# Patient Record
Sex: FEMALE | Race: WHITE | NOT HISPANIC OR LATINO | Employment: FULL TIME | URBAN - NONMETROPOLITAN AREA
[De-identification: names, ages, dates, MRNs, and addresses within clinical notes are randomized per-mention and may not be internally consistent; named-entity substitution may affect disease eponyms.]

---

## 2017-03-29 ENCOUNTER — APPOINTMENT (OUTPATIENT)
Dept: ULTRASOUND IMAGING | Facility: HOSPITAL | Age: 22
End: 2017-03-29

## 2017-03-29 ENCOUNTER — HOSPITAL ENCOUNTER (OUTPATIENT)
Facility: HOSPITAL | Age: 22
Setting detail: OBSERVATION
Discharge: HOME | End: 2017-03-30
Attending: EMERGENCY MEDICINE | Admitting: OBSTETRICS & GYNECOLOGY

## 2017-03-29 DIAGNOSIS — O03.9 SPONTANEOUS ABORTION: Primary | ICD-10-CM

## 2017-03-29 DIAGNOSIS — D62 ACUTE BLOOD LOSS ANEMIA: ICD-10-CM

## 2017-03-29 PROCEDURE — 99284 EMERGENCY DEPT VISIT MOD MDM: CPT

## 2017-03-30 ENCOUNTER — APPOINTMENT (OUTPATIENT)
Dept: ULTRASOUND IMAGING | Facility: HOSPITAL | Age: 22
End: 2017-03-30

## 2017-03-30 VITALS
SYSTOLIC BLOOD PRESSURE: 104 MMHG | RESPIRATION RATE: 18 BRPM | DIASTOLIC BLOOD PRESSURE: 54 MMHG | OXYGEN SATURATION: 98 % | WEIGHT: 116 LBS | BODY MASS INDEX: 20.55 KG/M2 | TEMPERATURE: 98.1 F | HEIGHT: 63 IN | HEART RATE: 101 BPM

## 2017-03-30 PROBLEM — O03.9 SPONTANEOUS ABORTION: Status: ACTIVE | Noted: 2017-03-30

## 2017-03-30 PROBLEM — O03.9 SPONTANEOUS ABORTION: Status: RESOLVED | Noted: 2017-03-30 | Resolved: 2017-03-30

## 2017-03-30 LAB
ABO GROUP BLD: NORMAL
BASOPHILS # BLD AUTO: 0.01 10*3/MM3 (ref 0–0.3)
BASOPHILS NFR BLD AUTO: 0.1 % (ref 0–2)
BLD GP AB SCN SERPL QL: NEGATIVE
DEPRECATED RDW RBC AUTO: 54.3 FL (ref 37–54)
EOSINOPHIL # BLD AUTO: 0.18 10*3/MM3 (ref 0–0.7)
EOSINOPHIL NFR BLD AUTO: 1.9 % (ref 0–5)
ERYTHROCYTE [DISTWIDTH] IN BLOOD BY AUTOMATED COUNT: 20.6 % (ref 11.5–14.5)
HCT VFR BLD AUTO: 25.4 % (ref 37–47)
HCT VFR BLD AUTO: 27.9 % (ref 37–47)
HGB BLD-MCNC: 7.6 G/DL (ref 12–16)
HGB BLD-MCNC: 8.8 G/DL (ref 12–16)
IMM GRANULOCYTES # BLD: 0.01 10*3/MM3 (ref 0–0.03)
IMM GRANULOCYTES NFR BLD: 0.1 % (ref 0–0.5)
LYMPHOCYTES # BLD AUTO: 2.54 10*3/MM3 (ref 1–3)
LYMPHOCYTES NFR BLD AUTO: 26.9 % (ref 21–51)
MCH RBC QN AUTO: 24 PG (ref 27–33)
MCHC RBC AUTO-ENTMCNC: 31.5 G/DL (ref 33–37)
MCV RBC AUTO: 76 FL (ref 80–94)
MONOCYTES # BLD AUTO: 0.5 10*3/MM3 (ref 0.1–0.9)
MONOCYTES NFR BLD AUTO: 5.3 % (ref 0–10)
NEUTROPHILS # BLD AUTO: 6.19 10*3/MM3 (ref 1.4–6.5)
NEUTROPHILS NFR BLD AUTO: 65.7 % (ref 30–70)
PLATELET # BLD AUTO: 228 10*3/MM3 (ref 130–400)
PMV BLD AUTO: 11.7 FL (ref 6–10)
RBC # BLD AUTO: 3.67 10*6/MM3 (ref 4.2–5.4)
RH BLD: POSITIVE
WBC NRBC COR # BLD: 9.43 10*3/MM3 (ref 4.5–12.5)

## 2017-03-30 PROCEDURE — 88300 SURGICAL PATH GROSS: CPT | Performed by: EMERGENCY MEDICINE

## 2017-03-30 PROCEDURE — 76817 TRANSVAGINAL US OBSTETRIC: CPT | Performed by: RADIOLOGY

## 2017-03-30 PROCEDURE — G0378 HOSPITAL OBSERVATION PER HR: HCPCS

## 2017-03-30 PROCEDURE — 85014 HEMATOCRIT: CPT | Performed by: PHYSICIAN ASSISTANT

## 2017-03-30 PROCEDURE — 86901 BLOOD TYPING SEROLOGIC RH(D): CPT | Performed by: PHYSICIAN ASSISTANT

## 2017-03-30 PROCEDURE — 85025 COMPLETE CBC W/AUTO DIFF WBC: CPT | Performed by: PHYSICIAN ASSISTANT

## 2017-03-30 PROCEDURE — 96374 THER/PROPH/DIAG INJ IV PUSH: CPT

## 2017-03-30 PROCEDURE — 25010000002 MORPHINE SULFATE (PF) 2 MG/ML SOLUTION: Performed by: EMERGENCY MEDICINE

## 2017-03-30 PROCEDURE — 86900 BLOOD TYPING SEROLOGIC ABO: CPT | Performed by: PHYSICIAN ASSISTANT

## 2017-03-30 PROCEDURE — 76817 TRANSVAGINAL US OBSTETRIC: CPT

## 2017-03-30 PROCEDURE — 96375 TX/PRO/DX INJ NEW DRUG ADDON: CPT

## 2017-03-30 PROCEDURE — 96361 HYDRATE IV INFUSION ADD-ON: CPT

## 2017-03-30 PROCEDURE — 85018 HEMOGLOBIN: CPT | Performed by: PHYSICIAN ASSISTANT

## 2017-03-30 PROCEDURE — 86920 COMPATIBILITY TEST SPIN: CPT

## 2017-03-30 PROCEDURE — 86850 RBC ANTIBODY SCREEN: CPT | Performed by: EMERGENCY MEDICINE

## 2017-03-30 RX ORDER — FUROSEMIDE 10 MG/ML
20 INJECTION INTRAMUSCULAR; INTRAVENOUS ONCE
Status: DISCONTINUED | OUTPATIENT
Start: 2017-03-30 | End: 2017-03-30 | Stop reason: HOSPADM

## 2017-03-30 RX ORDER — ACETAMINOPHEN 325 MG/1
650 TABLET ORAL ONCE
Status: DISCONTINUED | OUTPATIENT
Start: 2017-03-30 | End: 2017-03-30 | Stop reason: HOSPADM

## 2017-03-30 RX ORDER — SODIUM CHLORIDE, SODIUM LACTATE, POTASSIUM CHLORIDE, CALCIUM CHLORIDE 600; 310; 30; 20 MG/100ML; MG/100ML; MG/100ML; MG/100ML
125 INJECTION, SOLUTION INTRAVENOUS CONTINUOUS
Status: DISCONTINUED | OUTPATIENT
Start: 2017-03-30 | End: 2017-03-30 | Stop reason: HOSPADM

## 2017-03-30 RX ORDER — OXYTOCIN 10 [USP'U]/ML
INJECTION, SOLUTION INTRAMUSCULAR; INTRAVENOUS
Status: COMPLETED
Start: 2017-03-30 | End: 2017-03-30

## 2017-03-30 RX ORDER — SODIUM CHLORIDE 0.9 % (FLUSH) 0.9 %
10 SYRINGE (ML) INJECTION AS NEEDED
Status: DISCONTINUED | OUTPATIENT
Start: 2017-03-30 | End: 2017-03-30 | Stop reason: HOSPADM

## 2017-03-30 RX ORDER — PRENATAL VIT/IRON FUM/FOLIC AC 27MG-0.8MG
1 TABLET ORAL DAILY
COMMUNITY
End: 2017-03-30 | Stop reason: HOSPADM

## 2017-03-30 RX ORDER — DIPHENHYDRAMINE HCL 25 MG
25 CAPSULE ORAL ONCE
Status: DISCONTINUED | OUTPATIENT
Start: 2017-03-30 | End: 2017-03-30 | Stop reason: HOSPADM

## 2017-03-30 RX ORDER — OXYTOCIN 10 [USP'U]/ML
10 INJECTION, SOLUTION INTRAMUSCULAR; INTRAVENOUS ONCE
Status: COMPLETED | OUTPATIENT
Start: 2017-03-30 | End: 2017-03-30

## 2017-03-30 RX ORDER — PRENATAL VIT/IRON FUM/FOLIC AC 27MG-0.8MG
1 TABLET ORAL DAILY
Status: DISCONTINUED | OUTPATIENT
Start: 2017-03-31 | End: 2017-03-30 | Stop reason: HOSPADM

## 2017-03-30 RX ORDER — MORPHINE SULFATE 2 MG/ML
2 INJECTION, SOLUTION INTRAMUSCULAR; INTRAVENOUS ONCE
Status: COMPLETED | OUTPATIENT
Start: 2017-03-30 | End: 2017-03-30

## 2017-03-30 RX ADMIN — OXYTOCIN 10 UNITS: 10 INJECTION, SOLUTION INTRAMUSCULAR; INTRAVENOUS at 00:53

## 2017-03-30 RX ADMIN — SODIUM CHLORIDE 1000 ML: 9 INJECTION, SOLUTION INTRAVENOUS at 02:29

## 2017-03-30 RX ADMIN — SODIUM CHLORIDE 1000 ML: 9 INJECTION, SOLUTION INTRAVENOUS at 00:10

## 2017-03-30 RX ADMIN — MORPHINE SULFATE 2 MG: 2 INJECTION, SOLUTION INTRAMUSCULAR; INTRAVENOUS at 01:13

## 2017-04-02 LAB
ABO + RH BLD: NORMAL
ABO + RH BLD: NORMAL
BH BB BLOOD EXPIRATION DATE: NORMAL
BH BB BLOOD EXPIRATION DATE: NORMAL
BH BB BLOOD TYPE BARCODE: 6200
BH BB BLOOD TYPE BARCODE: 6200
BH BB DISPENSE STATUS: NORMAL
BH BB DISPENSE STATUS: NORMAL
BH BB PRODUCT CODE: NORMAL
BH BB PRODUCT CODE: NORMAL
BH BB UNIT NUMBER: NORMAL
BH BB UNIT NUMBER: NORMAL
CROSSMATCH INTERPRETATION: NORMAL
CROSSMATCH INTERPRETATION: NORMAL
UNIT  ABO: NORMAL
UNIT  ABO: NORMAL
UNIT  RH: NORMAL
UNIT  RH: NORMAL

## 2017-04-03 ENCOUNTER — HOSPITAL ENCOUNTER (OUTPATIENT)
Facility: HOSPITAL | Age: 22
Setting detail: OBSERVATION
Discharge: HOME | End: 2017-04-04
Attending: EMERGENCY MEDICINE | Admitting: OBSTETRICS & GYNECOLOGY

## 2017-04-03 ENCOUNTER — LAB (OUTPATIENT)
Dept: LAB | Facility: HOSPITAL | Age: 22
End: 2017-04-03

## 2017-04-03 ENCOUNTER — TRANSCRIBE ORDERS (OUTPATIENT)
Dept: ADMINISTRATIVE | Facility: HOSPITAL | Age: 22
End: 2017-04-03

## 2017-04-03 DIAGNOSIS — O02.1 ABORTION, MISSED: Primary | ICD-10-CM

## 2017-04-03 DIAGNOSIS — O02.1 ABORTION, MISSED: ICD-10-CM

## 2017-04-03 DIAGNOSIS — D62 ACUTE BLOOD LOSS ANEMIA: Primary | ICD-10-CM

## 2017-04-03 DIAGNOSIS — O03.6: ICD-10-CM

## 2017-04-03 LAB
ABO GROUP BLD: NORMAL
BASOPHILS # BLD AUTO: 0.01 10*3/MM3 (ref 0–0.3)
BASOPHILS NFR BLD AUTO: 0.2 % (ref 0–2)
BLD GP AB SCN SERPL QL: NEGATIVE
DEPRECATED RDW RBC AUTO: 59.5 FL (ref 37–54)
EOSINOPHIL # BLD AUTO: 0.14 10*3/MM3 (ref 0–0.7)
EOSINOPHIL NFR BLD AUTO: 2.6 % (ref 0–5)
ERYTHROCYTE [DISTWIDTH] IN BLOOD BY AUTOMATED COUNT: 20.7 % (ref 11.5–14.5)
HCG INTACT+B SERPL-ACNC: 591 MIU/ML (ref 0–5)
HCT VFR BLD AUTO: 19 % (ref 37–47)
HGB BLD-MCNC: 5.8 G/DL (ref 12–16)
IMM GRANULOCYTES # BLD: 0.01 10*3/MM3 (ref 0–0.03)
IMM GRANULOCYTES NFR BLD: 0.2 % (ref 0–0.5)
LAB AP CASE REPORT: NORMAL
LYMPHOCYTES # BLD AUTO: 1.5 10*3/MM3 (ref 1–3)
LYMPHOCYTES NFR BLD AUTO: 27.7 % (ref 21–51)
Lab: NORMAL
MCH RBC QN AUTO: 24 PG (ref 27–33)
MCHC RBC AUTO-ENTMCNC: 30.5 G/DL (ref 33–37)
MCV RBC AUTO: 78.5 FL (ref 80–94)
MONOCYTES # BLD AUTO: 0.35 10*3/MM3 (ref 0.1–0.9)
MONOCYTES NFR BLD AUTO: 6.5 % (ref 0–10)
NEUTROPHILS # BLD AUTO: 3.4 10*3/MM3 (ref 1.4–6.5)
NEUTROPHILS NFR BLD AUTO: 62.8 % (ref 30–70)
PATH REPORT.FINAL DX SPEC: NORMAL
PLATELET # BLD AUTO: 234 10*3/MM3 (ref 130–400)
PMV BLD AUTO: 10.5 FL (ref 6–10)
RBC # BLD AUTO: 2.42 10*6/MM3 (ref 4.2–5.4)
RH BLD: POSITIVE
WBC NRBC COR # BLD: 5.41 10*3/MM3 (ref 4.5–12.5)

## 2017-04-03 PROCEDURE — 84702 CHORIONIC GONADOTROPIN TEST: CPT | Performed by: NURSE PRACTITIONER

## 2017-04-03 PROCEDURE — 36430 TRANSFUSION BLD/BLD COMPNT: CPT

## 2017-04-03 PROCEDURE — 99284 EMERGENCY DEPT VISIT MOD MDM: CPT

## 2017-04-03 PROCEDURE — P9016 RBC LEUKOCYTES REDUCED: HCPCS

## 2017-04-03 PROCEDURE — 36415 COLL VENOUS BLD VENIPUNCTURE: CPT

## 2017-04-03 PROCEDURE — 85025 COMPLETE CBC W/AUTO DIFF WBC: CPT | Performed by: NURSE PRACTITIONER

## 2017-04-03 PROCEDURE — G0378 HOSPITAL OBSERVATION PER HR: HCPCS

## 2017-04-03 PROCEDURE — 86920 COMPATIBILITY TEST SPIN: CPT

## 2017-04-03 PROCEDURE — 86901 BLOOD TYPING SEROLOGIC RH(D): CPT | Performed by: EMERGENCY MEDICINE

## 2017-04-03 PROCEDURE — 86900 BLOOD TYPING SEROLOGIC ABO: CPT | Performed by: EMERGENCY MEDICINE

## 2017-04-03 PROCEDURE — 63710000001 DIPHENHYDRAMINE PER 50 MG: Performed by: OBSTETRICS & GYNECOLOGY

## 2017-04-03 PROCEDURE — 86900 BLOOD TYPING SEROLOGIC ABO: CPT

## 2017-04-03 PROCEDURE — 86850 RBC ANTIBODY SCREEN: CPT | Performed by: EMERGENCY MEDICINE

## 2017-04-03 RX ORDER — IBUPROFEN 200 MG
200 TABLET ORAL EVERY 6 HOURS PRN
Status: CANCELLED | OUTPATIENT
Start: 2017-04-03

## 2017-04-03 RX ORDER — ACETAMINOPHEN 325 MG/1
650 TABLET ORAL EVERY 6 HOURS PRN
Status: ON HOLD | COMMUNITY
End: 2018-04-24

## 2017-04-03 RX ORDER — IBUPROFEN 600 MG/1
600 TABLET ORAL EVERY 6 HOURS PRN
Status: DISCONTINUED | OUTPATIENT
Start: 2017-04-03 | End: 2017-04-04 | Stop reason: HOSPADM

## 2017-04-03 RX ORDER — ACETAMINOPHEN 325 MG/1
650 TABLET ORAL EVERY 6 HOURS PRN
Status: CANCELLED | OUTPATIENT
Start: 2017-04-03

## 2017-04-03 RX ORDER — SODIUM CHLORIDE 9 MG/ML
125 INJECTION, SOLUTION INTRAVENOUS ONCE
Status: COMPLETED | OUTPATIENT
Start: 2017-04-03 | End: 2017-04-03

## 2017-04-03 RX ORDER — SODIUM CHLORIDE 9 MG/ML
125 INJECTION, SOLUTION INTRAVENOUS CONTINUOUS
Status: DISCONTINUED | OUTPATIENT
Start: 2017-04-03 | End: 2017-04-04 | Stop reason: HOSPADM

## 2017-04-03 RX ORDER — SODIUM CHLORIDE, SODIUM LACTATE, POTASSIUM CHLORIDE, CALCIUM CHLORIDE 600; 310; 30; 20 MG/100ML; MG/100ML; MG/100ML; MG/100ML
125 INJECTION, SOLUTION INTRAVENOUS CONTINUOUS
Status: DISCONTINUED | OUTPATIENT
Start: 2017-04-03 | End: 2017-04-04 | Stop reason: HOSPADM

## 2017-04-03 RX ORDER — DIPHENHYDRAMINE HCL 25 MG
25 CAPSULE ORAL ONCE
Status: COMPLETED | OUTPATIENT
Start: 2017-04-03 | End: 2017-04-03

## 2017-04-03 RX ORDER — SODIUM CHLORIDE 9 MG/ML
INJECTION, SOLUTION INTRAVENOUS
Status: COMPLETED
Start: 2017-04-03 | End: 2017-04-03

## 2017-04-03 RX ORDER — ACETAMINOPHEN 325 MG/1
975 TABLET ORAL ONCE
Status: COMPLETED | OUTPATIENT
Start: 2017-04-03 | End: 2017-04-03

## 2017-04-03 RX ORDER — ACETAMINOPHEN 325 MG/1
TABLET ORAL
Status: COMPLETED
Start: 2017-04-03 | End: 2017-04-03

## 2017-04-03 RX ORDER — IBUPROFEN 200 MG
200 TABLET ORAL EVERY 6 HOURS PRN
Status: ON HOLD | COMMUNITY
End: 2018-04-07

## 2017-04-03 RX ADMIN — DIPHENHYDRAMINE HYDROCHLORIDE 25 MG: 25 CAPSULE ORAL at 15:06

## 2017-04-03 RX ADMIN — SODIUM CHLORIDE 125 ML/HR: 9 INJECTION, SOLUTION INTRAVENOUS at 17:50

## 2017-04-03 RX ADMIN — ACETAMINOPHEN 975 MG: 325 TABLET, FILM COATED ORAL at 13:45

## 2017-04-03 RX ADMIN — ACETAMINOPHEN 975 MG: 325 TABLET ORAL at 13:45

## 2017-04-03 RX ADMIN — IBUPROFEN 600 MG: 600 TABLET ORAL at 15:05

## 2017-04-03 RX ADMIN — SODIUM CHLORIDE, POTASSIUM CHLORIDE, SODIUM LACTATE AND CALCIUM CHLORIDE 125 ML/HR: 600; 310; 30; 20 INJECTION, SOLUTION INTRAVENOUS at 19:27

## 2017-04-03 RX ADMIN — SODIUM CHLORIDE 125 ML/HR: 9 INJECTION, SOLUTION INTRAVENOUS at 20:58

## 2017-04-03 NOTE — ED PROVIDER NOTES
Subjective   History of Present Illness  21-year-old white female here today for anemia.  Patient was admitted for spontaneous  4 days ago by her OB.  At the time of her discharge on 3/30 her hemoglobin was 7.6.  She went to their office today and saw the nurse practitioner for follow-up.  She had labs drawn and went home.  Later, they called her and told her to come to the emergency room because she was anemic.  She states that she is still having some bleeding, approximately 1 pad every 1/2-2 hours.  Review of Systems   All other systems reviewed and are negative.      No past medical history on file.    No Known Allergies    No past surgical history on file.    No family history on file.    Social History     Social History   • Marital status: Single     Spouse name: N/A   • Number of children: N/A   • Years of education: N/A     Social History Main Topics   • Smoking status: Never Smoker   • Smokeless tobacco: Not on file   • Alcohol use No   • Drug use: No   • Sexual activity: Defer     Other Topics Concern   • Not on file     Social History Narrative   • No narrative on file           Objective   Physical Exam   Constitutional: She is oriented to person, place, and time. She appears well-developed.   HENT:   Head: Normocephalic and atraumatic.   Cardiovascular: Normal rate, regular rhythm and normal heart sounds.  Exam reveals no gallop and no friction rub.    No murmur heard.  Pulmonary/Chest: Effort normal and breath sounds normal. No respiratory distress. She has no wheezes. She has no rales.   Abdominal: Soft. Bowel sounds are normal. She exhibits no distension. There is no tenderness.   Musculoskeletal: Normal range of motion. She exhibits no edema.   Neurological: She is alert and oriented to person, place, and time.   Skin: Skin is warm and dry. There is pallor.   Psychiatric: She has a normal mood and affect.   Nursing note and vitals reviewed.      Procedures  Results for orders placed or  performed in visit on 17   hCG, Quantitative, Pregnancy   Result Value Ref Range    HCG Quantitative 591.00 (H) 0.00 - 5.00 mIU/mL   CBC Auto Differential   Result Value Ref Range    WBC 5.41 4.50 - 12.50 10*3/mm3    RBC 2.42 (L) 4.20 - 5.40 10*6/mm3    Hemoglobin 5.8 (C) 12.0 - 16.0 g/dL    Hematocrit 19.0 (C) 37.0 - 47.0 %    MCV 78.5 (L) 80.0 - 94.0 fL    MCH 24.0 (L) 27.0 - 33.0 pg    MCHC 30.5 (L) 33.0 - 37.0 g/dL    RDW 20.7 (H) 11.5 - 14.5 %    RDW-SD 59.5 (H) 37.0 - 54.0 fl    MPV 10.5 (H) 6.0 - 10.0 fL    Platelets 234 130 - 400 10*3/mm3    Neutrophil % 62.8 30.0 - 70.0 %    Lymphocyte % 27.7 21.0 - 51.0 %    Monocyte % 6.5 0.0 - 10.0 %    Eosinophil % 2.6 0.0 - 5.0 %    Basophil % 0.2 0.0 - 2.0 %    Immature Grans % 0.2 0.0 - 0.5 %    Neutrophils, Absolute 3.40 1.40 - 6.50 10*3/mm3    Lymphocytes, Absolute 1.50 1.00 - 3.00 10*3/mm3    Monocytes, Absolute 0.35 0.10 - 0.90 10*3/mm3    Eosinophils, Absolute 0.14 0.00 - 0.70 10*3/mm3    Basophils, Absolute 0.01 0.00 - 0.30 10*3/mm3    Immature Grans, Absolute 0.01 0.00 - 0.03 10*3/mm3            ED Course  ED Course   Comment By Time   Discussed with Dr. Jin.  Patient admitted, see orders. Darrick Riggs MD  [id number]                  MDM  Number of Diagnoses or Management Options  Acute blood loss anemia:   Spontaneous  complicated by delayed or excessive hemorrhage:      Amount and/or Complexity of Data Reviewed  Clinical lab tests: reviewed and ordered  Decide to obtain previous medical records or to obtain history from someone other than the patient: yes    Risk of Complications, Morbidity, and/or Mortality  Presenting problems: moderate  Diagnostic procedures: moderate  Management options: moderate        Final diagnoses:   Acute blood loss anemia   Spontaneous  complicated by delayed or excessive hemorrhage            Darrick Riggs MD  [phone]

## 2017-04-03 NOTE — PLAN OF CARE
Problem: Patient Care Overview (Adult)  Goal: Plan of Care Review  Outcome: Ongoing (interventions implemented as appropriate)    04/03/17 8963   Coping/Psychosocial Response Interventions   Plan Of Care Reviewed With patient;significant other   Patient Care Overview   Progress progress toward functional goals as expected   Outcome Evaluation   Outcome Summary/Follow up Plan patient is doing well., getting 2 units of blood, scant vag bleeding.        Goal: Adult Individualization and Mutuality  Outcome: Ongoing (interventions implemented as appropriate)  Goal: Discharge Needs Assessment  Outcome: Ongoing (interventions implemented as appropriate)    Problem: Fluid Volume Deficit (Adult)  Goal: Identify Related Risk Factors and Signs and Symptoms  Outcome: Ongoing (interventions implemented as appropriate)  Goal: Fluid/Electrolyte Balance  Outcome: Ongoing (interventions implemented as appropriate)  Goal: Comfort/Well Being  Outcome: Ongoing (interventions implemented as appropriate)

## 2017-04-04 VITALS
OXYGEN SATURATION: 97 % | DIASTOLIC BLOOD PRESSURE: 66 MMHG | TEMPERATURE: 98.6 F | WEIGHT: 115 LBS | HEART RATE: 68 BPM | BODY MASS INDEX: 21.16 KG/M2 | SYSTOLIC BLOOD PRESSURE: 100 MMHG | RESPIRATION RATE: 16 BRPM | HEIGHT: 62 IN

## 2017-04-04 PROBLEM — D62 ACUTE BLOOD LOSS ANEMIA: Status: RESOLVED | Noted: 2017-04-03 | Resolved: 2017-04-04

## 2017-04-04 LAB
ABO + RH BLD: NORMAL
ABO + RH BLD: NORMAL
BH BB BLOOD EXPIRATION DATE: NORMAL
BH BB BLOOD EXPIRATION DATE: NORMAL
BH BB BLOOD TYPE BARCODE: 6200
BH BB BLOOD TYPE BARCODE: 6200
BH BB DISPENSE STATUS: NORMAL
BH BB DISPENSE STATUS: NORMAL
BH BB PRODUCT CODE: NORMAL
BH BB PRODUCT CODE: NORMAL
BH BB UNIT NUMBER: NORMAL
BH BB UNIT NUMBER: NORMAL
CROSSMATCH INTERPRETATION: NORMAL
CROSSMATCH INTERPRETATION: NORMAL
HCT VFR BLD AUTO: 23 % (ref 37–47)
HGB BLD-MCNC: 7.1 G/DL (ref 12–16)
UNIT  ABO: NORMAL
UNIT  ABO: NORMAL
UNIT  RH: NORMAL
UNIT  RH: NORMAL

## 2017-04-04 PROCEDURE — G0378 HOSPITAL OBSERVATION PER HR: HCPCS

## 2017-04-04 PROCEDURE — 85014 HEMATOCRIT: CPT | Performed by: OBSTETRICS & GYNECOLOGY

## 2017-04-04 PROCEDURE — 85018 HEMOGLOBIN: CPT | Performed by: OBSTETRICS & GYNECOLOGY

## 2017-04-04 RX ADMIN — SODIUM CHLORIDE, POTASSIUM CHLORIDE, SODIUM LACTATE AND CALCIUM CHLORIDE 125 ML/HR: 600; 310; 30; 20 INJECTION, SOLUTION INTRAVENOUS at 06:37

## 2017-04-04 NOTE — H&P
Chief complaint   Chief Complaint   Patient presents with   • Abnormal Lab     low hgb       History of Present Illness: Patient is a 21 y.o. female who presents for a blood transfusion. Vaginal Bleeding secondary to spontaneous AB. Hematocrit 5.       PAST MEDICAL HISTORY   Spontaneous AB  Hematocrit 5    PAST SURGICAL HISTORY   Unremarkable    History reviewed. No pertinent family history.    Social History   Substance Use Topics   • Smoking status: Never Smoker   • Smokeless tobacco: None   • Alcohol use No       Prescriptions Prior to Admission   Medication Sig Dispense Refill Last Dose   • acetaminophen (TYLENOL) 325 MG tablet Take 650 mg by mouth Every 6 (Six) Hours As Needed for Mild Pain (1-3).   4/3/2017 at Unknown time   • ibuprofen (ADVIL,MOTRIN) 200 MG tablet Take 200 mg by mouth Every 6 (Six) Hours As Needed for Mild Pain (1-3).   4/2/2017 at Unknown time       Allergies:  Review of patient's allergies indicates no known allergies.      Vital Signs  Temp:  [98 °F (36.7 °C)-99.7 °F (37.6 °C)] 98.6 °F (37 °C)  Heart Rate:  [67-91] 68  Resp:  [16-20] 16  BP: ()/(51-75) 100/66      Review of Systems    The following systems were reviewed and negative;  ENT, respiratory, cardiovascular, gastrointestinal, genitourinary, breast, endocrine and allergies / immunologic.      Physical Exam:      General Appearance:    Alert, cooperative, in no acute distress   Head:    Normocephalic, without obvious abnormality, atraumatic   Eyes:            Lids and lashes normal, conjunctivae and sclerae normal, no   icterus, no pallor, corneas clear, PERRLA   Ears:    Ears appear intact with no abnormalities noted   Throat:   No oral lesions, no thrush, oral mucosa moist   Neck:   No adenopathy, supple, trachea midline, no thyromegaly, no     carotid bruit, no JVD   Back:     No kyphosis present, no scoliosis present, no skin lesions,       erythema or scars, no tenderness to percussion or                   palpation,    range of motion normal   Lungs:     Clear to auscultation,respirations regular, even and                   unlabored    Heart:    Regular rhythm and normal rate, normal S1 and S2, no            murmur, no gallop, no rub, no click   Breast Exam:    Deferred   Abdomen:     Normal bowel sounds, no masses, no organomegaly, soft        non-tender, non-distended, no guarding, no rebound                 tenderness   Genitalia:    Deferred   Extremities:   Moves all extremities well, no edema, no cyanosis, no              redness   Pulses:   Pulses palpable and equal bilaterally   Skin:   No bleeding, bruising or rash   Lymph nodes:   No palpable adenopathy   Neurologic:   Cranial nerves 2 - 12 grossly intact, sensation intact, DTR        present and equal bilaterally         Assessment: Patient is a 21 y.o. female who presents with vaginal bleeding. Spontaneous AB. Hematocrit 5.    Plan of Care: Proceed with 2 units blood transfusion.       Miguel Jin III, MD  04/04/17  9:50 AM

## 2017-04-04 NOTE — PLAN OF CARE
Problem: Patient Care Overview (Adult)  Goal: Plan of Care Review  Outcome: Ongoing (interventions implemented as appropriate)    04/03/17 1613 04/03/17 2000   Coping/Psychosocial Response Interventions   Plan Of Care Reviewed With --  patient   Patient Care Overview   Progress progress toward functional goals as expected --    Outcome Evaluation   Outcome Summary/Follow up Plan patient is doing well., getting 2 units of blood, scant vag bleeding.  --        Goal: Adult Individualization and Mutuality  Outcome: Ongoing (interventions implemented as appropriate)    Problem: Fluid Volume Deficit (Adult)  Goal: Identify Related Risk Factors and Signs and Symptoms  Outcome: Ongoing (interventions implemented as appropriate)    04/03/17 1613   Fluid Volume Deficit   Signs and Symptoms (Fluid Volume Deficit) lab value changes       Goal: Fluid/Electrolyte Balance  Outcome: Ongoing (interventions implemented as appropriate)  Goal: Comfort/Well Being  Outcome: Ongoing (interventions implemented as appropriate)

## 2017-04-04 NOTE — PLAN OF CARE
Problem: Patient Care Overview (Adult)  Goal: Plan of Care Review  Outcome: Outcome(s) achieved Date Met:  04/04/17  Goal: Adult Individualization and Mutuality  Outcome: Outcome(s) achieved Date Met:  04/04/17  Goal: Discharge Needs Assessment  Outcome: Outcome(s) achieved Date Met:  04/04/17

## 2017-04-04 NOTE — DISCHARGE SUMMARY
Discharge Summary    Admit Date: 4/3/2017 12:29 PM    Admit Diagnosis: Acute blood loss anemia [D62]    Date of Discharge:  4/4/2017    Discharge Diagnosis: Same        Hospital Course  Patient is a 21 y.o. female, admitted on 4-3-17 secondary to vaginal bleeding following a spontaneous AB. Patient doing well after blood transfusion. Symptoms have improved. Patient tolerating a regular diet and ambulating without difficulty. Will discharge to home today.         Vital Signs  Temp:  [98 °F (36.7 °C)-99.7 °F (37.6 °C)] 98.6 °F (37 °C)  Heart Rate:  [67-91] 68  Resp:  [16-20] 16  BP: ()/(51-75) 100/66    Review of Systems    The following systems were reviewed and negative;  ENT, respiratory, cardiovascular, gastrointestinal, genitourinary, breast, endocrine and allergies / immunologic.      Physical Exam:      General Appearance:    Alert, cooperative, in no acute distress   Head:    Normocephalic, without obvious abnormality, atraumatic   Eyes:            Lids and lashes normal, conjunctivae and sclerae normal, no   icterus, no pallor, corneas clear, PERRLA   Ears:    Ears appear intact with no abnormalities noted   Throat:   No oral lesions, no thrush, oral mucosa moist   Neck:   No adenopathy, supple, trachea midline, no thyromegaly, no     carotid bruit, no JVD   Back:     No kyphosis present, no scoliosis present, no skin lesions,       erythema or scars, no tenderness to percussion or                   palpation,   range of motion normal   Lungs:     Clear to auscultation,respirations regular, even and                   unlabored    Heart:    Regular rhythm and normal rate, normal S1 and S2, no            murmur, no gallop, no rub, no click   Breast Exam:    Deferred   Abdomen:     Normal bowel sounds, no masses, no organomegaly, soft        non-tender, non-distended, no guarding, no rebound                 tenderness   Genitalia:    Deferred   Extremities:   Moves all extremities well, no edema, no  cyanosis, no              redness   Pulses:   Pulses palpable and equal bilaterally   Skin:   No bleeding, bruising or rash   Lymph nodes:   No palpable adenopathy   Neurologic:   Cranial nerves 2 - 12 grossly intact, sensation intact, DTR        present and equal bilaterally             Condition on Discharge:  Stable    Urine Output Good    Discharge Diet: Regular    Follow-up Appointments  Patient will follow up in clinic this week.        Miguel Jin MD.   04/04/17  9:53 AM

## 2017-11-06 LAB
EXTERNAL CHLAMYDIA SCREEN: NEGATIVE
EXTERNAL GONORRHEA SCREEN: NEGATIVE
EXTERNAL HEPATITIS B SURFACE ANTIGEN: NEGATIVE
EXTERNAL SYPHILIS RPR SCREEN: NORMAL
HIV1 P24 AG SERPL QL IA: NORMAL

## 2017-12-06 ENCOUNTER — APPOINTMENT (OUTPATIENT)
Dept: ULTRASOUND IMAGING | Facility: HOSPITAL | Age: 22
End: 2017-12-06

## 2017-12-06 ENCOUNTER — HOSPITAL ENCOUNTER (EMERGENCY)
Facility: HOSPITAL | Age: 22
Discharge: HOME | End: 2017-12-06
Attending: EMERGENCY MEDICINE | Admitting: EMERGENCY MEDICINE

## 2017-12-06 VITALS
TEMPERATURE: 98.7 F | HEART RATE: 84 BPM | DIASTOLIC BLOOD PRESSURE: 77 MMHG | HEIGHT: 62 IN | WEIGHT: 109 LBS | OXYGEN SATURATION: 100 % | RESPIRATION RATE: 18 BRPM | BODY MASS INDEX: 20.06 KG/M2 | SYSTOLIC BLOOD PRESSURE: 109 MMHG

## 2017-12-06 DIAGNOSIS — N39.0 URINARY TRACT INFECTION WITH HEMATURIA, SITE UNSPECIFIED: ICD-10-CM

## 2017-12-06 DIAGNOSIS — Z3A.19 19 WEEKS GESTATION OF PREGNANCY: Primary | ICD-10-CM

## 2017-12-06 DIAGNOSIS — R31.9 URINARY TRACT INFECTION WITH HEMATURIA, SITE UNSPECIFIED: ICD-10-CM

## 2017-12-06 LAB
ALBUMIN SERPL-MCNC: 4 G/DL (ref 3.5–5)
ALBUMIN/GLOB SERPL: 1.4 G/DL (ref 1.5–2.5)
ALP SERPL-CCNC: 40 U/L (ref 35–104)
ALT SERPL W P-5'-P-CCNC: 40 U/L (ref 10–36)
ANION GAP SERPL CALCULATED.3IONS-SCNC: 7.5 MMOL/L (ref 3.6–11.2)
ANISOCYTOSIS BLD QL: NORMAL
AST SERPL-CCNC: 35 U/L (ref 10–30)
BACTERIA UR QL AUTO: ABNORMAL /HPF
BASOPHILS # BLD AUTO: 0.02 10*3/MM3 (ref 0–0.3)
BASOPHILS NFR BLD AUTO: 0.2 % (ref 0–2)
BILIRUB SERPL-MCNC: 0.3 MG/DL (ref 0.2–1.8)
BILIRUB UR QL STRIP: NEGATIVE
BUN BLD-MCNC: 8 MG/DL (ref 7–21)
BUN/CREAT SERPL: 18.2 (ref 7–25)
CALCIUM SPEC-SCNC: 9.3 MG/DL (ref 7.7–10)
CHLORIDE SERPL-SCNC: 103 MMOL/L (ref 99–112)
CLARITY UR: ABNORMAL
CO2 SERPL-SCNC: 25.5 MMOL/L (ref 24.3–31.9)
COLOR UR: ABNORMAL
CREAT BLD-MCNC: 0.44 MG/DL (ref 0.43–1.29)
DEPRECATED RDW RBC AUTO: 69.5 FL (ref 37–54)
EOSINOPHIL # BLD AUTO: 0.19 10*3/MM3 (ref 0–0.7)
EOSINOPHIL NFR BLD AUTO: 2 % (ref 0–5)
ERYTHROCYTE [DISTWIDTH] IN BLOOD BY AUTOMATED COUNT: 26 % (ref 11.5–14.5)
GFR SERPL CREATININE-BSD FRML MDRD: >150 ML/MIN/1.73
GLOBULIN UR ELPH-MCNC: 2.9 GM/DL
GLUCOSE BLD-MCNC: 86 MG/DL (ref 70–110)
GLUCOSE UR STRIP-MCNC: NEGATIVE MG/DL
HCT VFR BLD AUTO: 35.1 % (ref 37–47)
HGB BLD-MCNC: 11.7 G/DL (ref 12–16)
HGB UR QL STRIP.AUTO: ABNORMAL
HYALINE CASTS UR QL AUTO: ABNORMAL /LPF
IMM GRANULOCYTES # BLD: 0.02 10*3/MM3 (ref 0–0.03)
IMM GRANULOCYTES NFR BLD: 0.2 % (ref 0–0.5)
KETONES UR QL STRIP: NEGATIVE
LARGE PLATELETS: NORMAL
LEUKOCYTE ESTERASE UR QL STRIP.AUTO: ABNORMAL
LYMPHOCYTES # BLD AUTO: 1.69 10*3/MM3 (ref 1–3)
LYMPHOCYTES NFR BLD AUTO: 17.6 % (ref 21–51)
MCH RBC QN AUTO: 25.5 PG (ref 27–33)
MCHC RBC AUTO-ENTMCNC: 33.3 G/DL (ref 33–37)
MCV RBC AUTO: 76.6 FL (ref 80–94)
MICROCYTES BLD QL: NORMAL
MONOCYTES # BLD AUTO: 0.56 10*3/MM3 (ref 0.1–0.9)
MONOCYTES NFR BLD AUTO: 5.8 % (ref 0–10)
NEUTROPHILS # BLD AUTO: 7.12 10*3/MM3 (ref 1.4–6.5)
NEUTROPHILS NFR BLD AUTO: 74.2 % (ref 30–70)
NITRITE UR QL STRIP: NEGATIVE
OSMOLALITY SERPL CALC.SUM OF ELEC: 269.6 MOSM/KG (ref 273–305)
PH UR STRIP.AUTO: 6.5 [PH] (ref 5–8)
PLATELET # BLD AUTO: 187 10*3/MM3 (ref 130–400)
PMV BLD AUTO: 11 FL (ref 6–10)
POTASSIUM BLD-SCNC: 3.9 MMOL/L (ref 3.5–5.3)
PROT SERPL-MCNC: 6.9 G/DL (ref 6–8)
PROT UR QL STRIP: ABNORMAL
RBC # BLD AUTO: 4.58 10*6/MM3 (ref 4.2–5.4)
RBC # UR: ABNORMAL /HPF
REF LAB TEST METHOD: ABNORMAL
SODIUM BLD-SCNC: 136 MMOL/L (ref 135–153)
SP GR UR STRIP: 1.02 (ref 1–1.03)
SQUAMOUS #/AREA URNS HPF: ABNORMAL /HPF
UROBILINOGEN UR QL STRIP: ABNORMAL
WBC NRBC COR # BLD: 9.6 10*3/MM3 (ref 4.5–12.5)
WBC UR QL AUTO: ABNORMAL /HPF

## 2017-12-06 PROCEDURE — 85007 BL SMEAR W/DIFF WBC COUNT: CPT | Performed by: PHYSICIAN ASSISTANT

## 2017-12-06 PROCEDURE — 87086 URINE CULTURE/COLONY COUNT: CPT | Performed by: PHYSICIAN ASSISTANT

## 2017-12-06 PROCEDURE — 96361 HYDRATE IV INFUSION ADD-ON: CPT

## 2017-12-06 PROCEDURE — 76805 OB US >/= 14 WKS SNGL FETUS: CPT | Performed by: RADIOLOGY

## 2017-12-06 PROCEDURE — 76805 OB US >/= 14 WKS SNGL FETUS: CPT

## 2017-12-06 PROCEDURE — 99283 EMERGENCY DEPT VISIT LOW MDM: CPT

## 2017-12-06 PROCEDURE — 80053 COMPREHEN METABOLIC PANEL: CPT | Performed by: PHYSICIAN ASSISTANT

## 2017-12-06 PROCEDURE — 85025 COMPLETE CBC W/AUTO DIFF WBC: CPT | Performed by: PHYSICIAN ASSISTANT

## 2017-12-06 PROCEDURE — 81001 URINALYSIS AUTO W/SCOPE: CPT | Performed by: PHYSICIAN ASSISTANT

## 2017-12-06 PROCEDURE — 96360 HYDRATION IV INFUSION INIT: CPT

## 2017-12-06 RX ORDER — SODIUM CHLORIDE, SODIUM LACTATE, POTASSIUM CHLORIDE, CALCIUM CHLORIDE 600; 310; 30; 20 MG/100ML; MG/100ML; MG/100ML; MG/100ML
1000 INJECTION, SOLUTION INTRAVENOUS CONTINUOUS
Status: DISCONTINUED | OUTPATIENT
Start: 2017-12-06 | End: 2017-12-06 | Stop reason: HOSPADM

## 2017-12-06 RX ORDER — CLINDAMYCIN HYDROCHLORIDE 150 MG/1
300 CAPSULE ORAL ONCE
Status: DISCONTINUED | OUTPATIENT
Start: 2017-12-06 | End: 2017-12-06

## 2017-12-06 RX ORDER — NITROFURANTOIN 25; 75 MG/1; MG/1
100 CAPSULE ORAL 2 TIMES DAILY
Qty: 14 CAPSULE | Refills: 0 | Status: ON HOLD | OUTPATIENT
Start: 2017-12-06 | End: 2018-03-29

## 2017-12-06 RX ORDER — NITROFURANTOIN 25; 75 MG/1; MG/1
100 CAPSULE ORAL ONCE
Status: COMPLETED | OUTPATIENT
Start: 2017-12-06 | End: 2017-12-06

## 2017-12-06 RX ADMIN — SODIUM CHLORIDE, POTASSIUM CHLORIDE, SODIUM LACTATE AND CALCIUM CHLORIDE 1000 ML/HR: 600; 310; 30; 20 INJECTION, SOLUTION INTRAVENOUS at 18:17

## 2017-12-06 RX ADMIN — NITROFURANTOIN MONOHYDRATE/MACROCRYSTALLINE 100 MG: 25; 75 CAPSULE ORAL at 21:40

## 2017-12-06 RX ADMIN — SODIUM CHLORIDE, POTASSIUM CHLORIDE, SODIUM LACTATE AND CALCIUM CHLORIDE 1000 ML/HR: 600; 310; 30; 20 INJECTION, SOLUTION INTRAVENOUS at 21:18

## 2017-12-06 NOTE — ED PROVIDER NOTES
Subjective   Patient is a 22 y.o. female presenting with pregnancy problem.   History provided by:  Patient   used: No    Pregnancy Problem   The current episode started more than 2 days ago. Episode is mild. Gestational age is 19 weeks.   Primary symptoms include abdominal pain.   Associated symptoms include no dysuria, no fever, no headaches, no nausea, no shortness of breath and no vomiting.       Review of Systems   Constitutional: Negative for chills and fever.   HENT: Negative for congestion, ear pain and sore throat.    Respiratory: Negative for cough, shortness of breath and wheezing.    Cardiovascular: Negative for chest pain.   Gastrointestinal: Positive for abdominal pain. Negative for diarrhea, nausea and vomiting.   Genitourinary: Positive for hematuria. Negative for dysuria and flank pain.   Skin: Negative for rash.   Neurological: Negative for headaches.   Psychiatric/Behavioral: The patient is not nervous/anxious.    All other systems reviewed and are negative.      No past medical history on file.    No Known Allergies    No past surgical history on file.    No family history on file.    Social History     Social History   • Marital status: Single     Spouse name: N/A   • Number of children: N/A   • Years of education: N/A     Social History Main Topics   • Smoking status: Never Smoker   • Smokeless tobacco: Not on file   • Alcohol use No   • Drug use: No   • Sexual activity: Defer     Other Topics Concern   • Not on file     Social History Narrative           Objective   Physical Exam   Constitutional: She is oriented to person, place, and time. She appears well-developed and well-nourished.   HENT:   Head: Normocephalic.   Mouth/Throat: Oropharynx is clear and moist.   Neck: Neck supple.   Cardiovascular: Normal rate and regular rhythm.    Pulmonary/Chest: Effort normal and breath sounds normal.   Abdominal: Soft. Bowel sounds are normal. There is no tenderness.    Musculoskeletal: Normal range of motion.   Neurological: She is alert and oriented to person, place, and time.   Skin: Skin is warm and dry.   Psychiatric: She has a normal mood and affect. Her behavior is normal. Judgment and thought content normal.   Nursing note and vitals reviewed.      Procedures         ED Course  ED Course   Comment By Time   DISCUSSED WITH DR TAYLOR WILL IN OFFICE TOMORROW   GET URINE CULTURE TYRONE Glover 12/06 9914                  [facility]    Final diagnoses:   19 weeks gestation of pregnancy   Urinary tract infection with hematuria, site unspecified            TYRONE Glover  12/07/17 2850

## 2017-12-09 LAB — BACTERIA SPEC AEROBE CULT: NORMAL

## 2018-03-29 ENCOUNTER — HOSPITAL ENCOUNTER (OUTPATIENT)
Facility: HOSPITAL | Age: 23
Discharge: HOME | End: 2018-03-30
Attending: OBSTETRICS & GYNECOLOGY | Admitting: OBSTETRICS & GYNECOLOGY

## 2018-03-29 VITALS
DIASTOLIC BLOOD PRESSURE: 77 MMHG | SYSTOLIC BLOOD PRESSURE: 121 MMHG | HEART RATE: 79 BPM | WEIGHT: 151.3 LBS | OXYGEN SATURATION: 100 % | BODY MASS INDEX: 27.84 KG/M2 | TEMPERATURE: 98.8 F | HEIGHT: 62 IN | RESPIRATION RATE: 18 BRPM

## 2018-03-29 PROBLEM — Z34.90 PREGNANCY: Status: ACTIVE | Noted: 2018-03-29

## 2018-03-29 LAB
DEPRECATED RDW RBC AUTO: 46.4 FL (ref 37–54)
ERYTHROCYTE [DISTWIDTH] IN BLOOD BY AUTOMATED COUNT: 14.4 % (ref 11.5–14.5)
HCT VFR BLD AUTO: 34.7 % (ref 37–47)
HGB BLD-MCNC: 11.9 G/DL (ref 12–16)
MCH RBC QN AUTO: 32 PG (ref 27–33)
MCHC RBC AUTO-ENTMCNC: 34.3 G/DL (ref 33–37)
MCV RBC AUTO: 93.3 FL (ref 80–94)
PLATELET # BLD AUTO: 144 10*3/MM3 (ref 130–400)
PMV BLD AUTO: 11.8 FL (ref 6–10)
RBC # BLD AUTO: 3.72 10*6/MM3 (ref 4.2–5.4)
WBC NRBC COR # BLD: 9.99 10*3/MM3 (ref 4.5–12.5)

## 2018-03-29 PROCEDURE — 81003 URINALYSIS AUTO W/O SCOPE: CPT | Performed by: OBSTETRICS & GYNECOLOGY

## 2018-03-29 PROCEDURE — P9612 CATHETERIZE FOR URINE SPEC: HCPCS

## 2018-03-29 PROCEDURE — 85027 COMPLETE CBC AUTOMATED: CPT | Performed by: OBSTETRICS & GYNECOLOGY

## 2018-03-29 PROCEDURE — 36415 COLL VENOUS BLD VENIPUNCTURE: CPT | Performed by: OBSTETRICS & GYNECOLOGY

## 2018-03-29 PROCEDURE — 59025 FETAL NON-STRESS TEST: CPT

## 2018-03-29 PROCEDURE — G0463 HOSPITAL OUTPT CLINIC VISIT: HCPCS

## 2018-03-29 RX ORDER — FERROUS SULFATE 325(65) MG
325 TABLET ORAL
Status: ON HOLD | COMMUNITY
End: 2019-09-13

## 2018-03-29 RX ORDER — LIDOCAINE HYDROCHLORIDE 10 MG/ML
5 INJECTION, SOLUTION EPIDURAL; INFILTRATION; INTRACAUDAL; PERINEURAL AS NEEDED
Status: DISCONTINUED | OUTPATIENT
Start: 2018-03-29 | End: 2018-03-30 | Stop reason: HOSPADM

## 2018-03-29 RX ORDER — SODIUM CHLORIDE 0.9 % (FLUSH) 0.9 %
1-10 SYRINGE (ML) INJECTION AS NEEDED
Status: DISCONTINUED | OUTPATIENT
Start: 2018-03-29 | End: 2018-03-30 | Stop reason: HOSPADM

## 2018-03-29 RX ORDER — PRENATAL VIT NO.126/IRON/FOLIC 28MG-0.8MG
1 TABLET ORAL DAILY
COMMUNITY

## 2018-03-30 LAB
BILIRUB UR QL STRIP: NEGATIVE
CLARITY UR: CLEAR
COLOR UR: ABNORMAL
GLUCOSE UR STRIP-MCNC: NEGATIVE MG/DL
HGB UR QL STRIP.AUTO: NEGATIVE
KETONES UR QL STRIP: NEGATIVE
LEUKOCYTE ESTERASE UR QL STRIP.AUTO: NEGATIVE
NITRITE UR QL STRIP: NEGATIVE
PH UR STRIP.AUTO: <=5 [PH] (ref 5–8)
PROT UR QL STRIP: NEGATIVE
SP GR UR STRIP: >1.03 (ref 1–1.03)
UROBILINOGEN UR QL STRIP: ABNORMAL

## 2018-04-04 LAB — EXTERNAL GROUP B STREP ANTIGEN: POSITIVE

## 2018-04-07 ENCOUNTER — HOSPITAL ENCOUNTER (OUTPATIENT)
Facility: HOSPITAL | Age: 23
Discharge: HOME | End: 2018-04-07
Attending: OBSTETRICS & GYNECOLOGY | Admitting: OBSTETRICS & GYNECOLOGY

## 2018-04-07 ENCOUNTER — HOSPITAL ENCOUNTER (OUTPATIENT)
Dept: LABOR AND DELIVERY | Facility: HOSPITAL | Age: 23
Discharge: HOME | End: 2018-04-07

## 2018-04-07 VITALS
WEIGHT: 151.3 LBS | HEIGHT: 62 IN | HEART RATE: 82 BPM | OXYGEN SATURATION: 100 % | BODY MASS INDEX: 27.84 KG/M2 | RESPIRATION RATE: 16 BRPM | DIASTOLIC BLOOD PRESSURE: 70 MMHG | SYSTOLIC BLOOD PRESSURE: 120 MMHG | TEMPERATURE: 98.6 F

## 2018-04-07 PROCEDURE — 59025 FETAL NON-STRESS TEST: CPT

## 2018-04-08 NOTE — NON STRESS TEST
Kanwal Mayorga, a  at 37w1d with an HARI of 2018, by Ultrasound, was seen at [facility] for a nonstress test.    Chief Complaint   Patient presents with   • Decreased Fetal Movement                Reactive NST: Verified by Julia Mera RN  FETAL NONSTRESS TEST REPORT      Gestational age: As recorded in hospital chart    Indication: See hospital chart    Accelerations: Present    Baseline fetal heart rate: 140    Decelerations: Few variables present    Conclusion: Reactive

## 2018-04-24 ENCOUNTER — HOSPITAL ENCOUNTER (OUTPATIENT)
Facility: HOSPITAL | Age: 23
Discharge: HOME | End: 2018-04-24
Attending: OBSTETRICS & GYNECOLOGY | Admitting: OBSTETRICS & GYNECOLOGY

## 2018-04-24 VITALS
RESPIRATION RATE: 18 BRPM | OXYGEN SATURATION: 99 % | WEIGHT: 155.2 LBS | SYSTOLIC BLOOD PRESSURE: 125 MMHG | HEIGHT: 62 IN | DIASTOLIC BLOOD PRESSURE: 70 MMHG | TEMPERATURE: 98 F | BODY MASS INDEX: 28.56 KG/M2 | HEART RATE: 80 BPM

## 2018-04-24 PROCEDURE — G0463 HOSPITAL OUTPT CLINIC VISIT: HCPCS

## 2018-04-24 PROCEDURE — 59025 FETAL NON-STRESS TEST: CPT

## 2018-04-24 NOTE — NON STRESS TEST
Kanwal Mayorga, a  at 39w4d with an HARI of 2018, by Ultrasound, was seen at [facility] for a nonstress test.    Chief Complaint   Patient presents with   • Vaginal Bleeding     small amount spotting this morning       Interpretation A  Nonstress Test Interpretation A: Reactive (18 1122 : Celeste Chapman, RN)  Comments A: Verified with KATELIN Grier RN (18 1122 : Celeste Chapman RN)

## 2018-04-25 ENCOUNTER — HOSPITAL ENCOUNTER (INPATIENT)
Facility: HOSPITAL | Age: 23
LOS: 2 days | Discharge: HOME | End: 2018-04-27
Attending: OBSTETRICS & GYNECOLOGY | Admitting: OBSTETRICS & GYNECOLOGY

## 2018-04-25 ENCOUNTER — ANESTHESIA (OUTPATIENT)
Dept: LABOR AND DELIVERY | Facility: HOSPITAL | Age: 23
End: 2018-04-25

## 2018-04-25 ENCOUNTER — ANESTHESIA EVENT (OUTPATIENT)
Dept: LABOR AND DELIVERY | Facility: HOSPITAL | Age: 23
End: 2018-04-25

## 2018-04-25 PROBLEM — IMO0002 RUPTURE OF MEMBRANES WITH CLEAR AMNIOTIC FLUID: Status: ACTIVE | Noted: 2018-04-25

## 2018-04-25 PROBLEM — O42.90 DELAYED DELIVERY AFTER SROM (SPONTANEOUS RUPTURE OF MEMBRANES): Status: ACTIVE | Noted: 2018-04-25

## 2018-04-25 LAB
A1 MICROGLOB PLACENTAL VAG QL: POSITIVE
ABO GROUP BLD: NORMAL
BLD GP AB SCN SERPL QL: NEGATIVE
DEPRECATED RDW RBC AUTO: 46.3 FL (ref 37–54)
ERYTHROCYTE [DISTWIDTH] IN BLOOD BY AUTOMATED COUNT: 14.1 % (ref 11.5–14.5)
HCT VFR BLD AUTO: 36.9 % (ref 37–47)
HGB BLD-MCNC: 12.7 G/DL (ref 12–16)
MCH RBC QN AUTO: 31.8 PG (ref 27–33)
MCHC RBC AUTO-ENTMCNC: 34.4 G/DL (ref 33–37)
MCV RBC AUTO: 92.5 FL (ref 80–94)
PLATELET # BLD AUTO: 135 10*3/MM3 (ref 130–400)
PMV BLD AUTO: 12.2 FL (ref 6–10)
RBC # BLD AUTO: 3.99 10*6/MM3 (ref 4.2–5.4)
RH BLD: POSITIVE
T&S EXPIRATION DATE: NORMAL
WBC NRBC COR # BLD: 9.75 10*3/MM3 (ref 4.5–12.5)

## 2018-04-25 PROCEDURE — 84112 EVAL AMNIOTIC FLUID PROTEIN: CPT | Performed by: OBSTETRICS & GYNECOLOGY

## 2018-04-25 PROCEDURE — C1755 CATHETER, INTRASPINAL: HCPCS | Performed by: ANESTHESIOLOGY

## 2018-04-25 PROCEDURE — 85027 COMPLETE CBC AUTOMATED: CPT | Performed by: OBSTETRICS & GYNECOLOGY

## 2018-04-25 PROCEDURE — C1755 CATHETER, INTRASPINAL: HCPCS

## 2018-04-25 PROCEDURE — 86900 BLOOD TYPING SEROLOGIC ABO: CPT | Performed by: OBSTETRICS & GYNECOLOGY

## 2018-04-25 PROCEDURE — 86850 RBC ANTIBODY SCREEN: CPT | Performed by: OBSTETRICS & GYNECOLOGY

## 2018-04-25 PROCEDURE — 86901 BLOOD TYPING SEROLOGIC RH(D): CPT | Performed by: OBSTETRICS & GYNECOLOGY

## 2018-04-25 PROCEDURE — 0UQMXZZ REPAIR VULVA, EXTERNAL APPROACH: ICD-10-PCS | Performed by: OBSTETRICS & GYNECOLOGY

## 2018-04-25 PROCEDURE — 59025 FETAL NON-STRESS TEST: CPT

## 2018-04-25 RX ORDER — ONDANSETRON 2 MG/ML
4 INJECTION INTRAMUSCULAR; INTRAVENOUS ONCE AS NEEDED
Status: DISCONTINUED | OUTPATIENT
Start: 2018-04-25 | End: 2018-04-25 | Stop reason: HOSPADM

## 2018-04-25 RX ORDER — LIDOCAINE HYDROCHLORIDE 20 MG/ML
INJECTION, SOLUTION EPIDURAL; INFILTRATION; INTRACAUDAL; PERINEURAL
Status: COMPLETED
Start: 2018-04-25 | End: 2018-04-25

## 2018-04-25 RX ORDER — LIDOCAINE HYDROCHLORIDE 20 MG/ML
INJECTION, SOLUTION EPIDURAL; INFILTRATION; INTRACAUDAL; PERINEURAL AS NEEDED
Status: DISCONTINUED | OUTPATIENT
Start: 2018-04-25 | End: 2018-05-16 | Stop reason: SURG

## 2018-04-25 RX ORDER — ONDANSETRON 4 MG/1
4 TABLET, ORALLY DISINTEGRATING ORAL EVERY 6 HOURS PRN
Status: DISCONTINUED | OUTPATIENT
Start: 2018-04-25 | End: 2018-04-25 | Stop reason: HOSPADM

## 2018-04-25 RX ORDER — MISOPROSTOL 100 UG/1
TABLET ORAL
Status: COMPLETED
Start: 2018-04-25 | End: 2018-04-25

## 2018-04-25 RX ORDER — EPHEDRINE SULFATE 50 MG/ML
10 INJECTION, SOLUTION INTRAVENOUS
Status: DISCONTINUED | OUTPATIENT
Start: 2018-04-25 | End: 2018-04-25 | Stop reason: HOSPADM

## 2018-04-25 RX ORDER — PRENATAL VIT/IRON FUM/FOLIC AC 27MG-0.8MG
1 TABLET ORAL DAILY
Status: DISCONTINUED | OUTPATIENT
Start: 2018-04-26 | End: 2018-04-26

## 2018-04-25 RX ORDER — DOCUSATE SODIUM 100 MG/1
100 CAPSULE, LIQUID FILLED ORAL DAILY
Status: DISCONTINUED | OUTPATIENT
Start: 2018-04-26 | End: 2018-04-27 | Stop reason: HOSPADM

## 2018-04-25 RX ORDER — LIDOCAINE HYDROCHLORIDE AND EPINEPHRINE 15; 5 MG/ML; UG/ML
INJECTION, SOLUTION EPIDURAL AS NEEDED
Status: DISCONTINUED | OUTPATIENT
Start: 2018-04-25 | End: 2018-05-16 | Stop reason: SURG

## 2018-04-25 RX ORDER — FENTANYL CIT 0.2 MG/100ML-ROPIV 0.2%-NACL 0.9% EPIDURAL INJ 2/0.2 MCG/ML-%
14 SOLUTION INJECTION CONTINUOUS
Status: DISCONTINUED | OUTPATIENT
Start: 2018-04-25 | End: 2018-04-26

## 2018-04-25 RX ORDER — FAMOTIDINE 10 MG/ML
20 INJECTION, SOLUTION INTRAVENOUS ONCE AS NEEDED
Status: DISCONTINUED | OUTPATIENT
Start: 2018-04-25 | End: 2018-04-25 | Stop reason: HOSPADM

## 2018-04-25 RX ORDER — ZOLPIDEM TARTRATE 5 MG/1
5 TABLET ORAL NIGHTLY PRN
Status: DISCONTINUED | OUTPATIENT
Start: 2018-04-25 | End: 2018-04-27 | Stop reason: HOSPADM

## 2018-04-25 RX ORDER — MISOPROSTOL 100 UG/1
600 TABLET ORAL ONCE
Status: COMPLETED | OUTPATIENT
Start: 2018-04-25 | End: 2018-04-25

## 2018-04-25 RX ORDER — IBUPROFEN 800 MG/1
800 TABLET ORAL 3 TIMES DAILY
Status: DISCONTINUED | OUTPATIENT
Start: 2018-04-25 | End: 2018-04-27 | Stop reason: HOSPADM

## 2018-04-25 RX ORDER — SODIUM CHLORIDE 0.9 % (FLUSH) 0.9 %
1-10 SYRINGE (ML) INJECTION AS NEEDED
Status: DISCONTINUED | OUTPATIENT
Start: 2018-04-25 | End: 2018-04-27 | Stop reason: HOSPADM

## 2018-04-25 RX ORDER — LIDOCAINE HYDROCHLORIDE 10 MG/ML
5 INJECTION, SOLUTION EPIDURAL; INFILTRATION; INTRACAUDAL; PERINEURAL AS NEEDED
Status: DISCONTINUED | OUTPATIENT
Start: 2018-04-25 | End: 2018-04-25 | Stop reason: HOSPADM

## 2018-04-25 RX ORDER — METOCLOPRAMIDE 10 MG/1
10 TABLET ORAL ONCE
Status: DISCONTINUED | OUTPATIENT
Start: 2018-04-25 | End: 2018-04-26

## 2018-04-25 RX ORDER — OXYTOCIN/RINGER'S LACTATE 20/1000 ML
1-2 PLASTIC BAG, INJECTION (ML) INTRAVENOUS
Status: DISCONTINUED | OUTPATIENT
Start: 2018-04-25 | End: 2018-04-26

## 2018-04-25 RX ORDER — FERROUS SULFATE 325(65) MG
325 TABLET ORAL
Status: DISCONTINUED | OUTPATIENT
Start: 2018-04-26 | End: 2018-04-26

## 2018-04-25 RX ORDER — LANOLIN 100 %
OINTMENT (GRAM) TOPICAL
Status: DISCONTINUED | OUTPATIENT
Start: 2018-04-25 | End: 2018-04-27 | Stop reason: HOSPADM

## 2018-04-25 RX ORDER — SODIUM CHLORIDE 0.9 % (FLUSH) 0.9 %
1-10 SYRINGE (ML) INJECTION AS NEEDED
Status: DISCONTINUED | OUTPATIENT
Start: 2018-04-25 | End: 2018-04-25 | Stop reason: HOSPADM

## 2018-04-25 RX ORDER — ONDANSETRON 2 MG/ML
4 INJECTION INTRAMUSCULAR; INTRAVENOUS EVERY 6 HOURS PRN
Status: DISCONTINUED | OUTPATIENT
Start: 2018-04-25 | End: 2018-04-27 | Stop reason: HOSPADM

## 2018-04-25 RX ORDER — ONDANSETRON 4 MG/1
4 TABLET, FILM COATED ORAL EVERY 6 HOURS PRN
Status: DISCONTINUED | OUTPATIENT
Start: 2018-04-25 | End: 2018-04-25 | Stop reason: HOSPADM

## 2018-04-25 RX ORDER — ONDANSETRON 4 MG/1
4 TABLET, ORALLY DISINTEGRATING ORAL EVERY 6 HOURS PRN
Status: DISCONTINUED | OUTPATIENT
Start: 2018-04-25 | End: 2018-04-27 | Stop reason: HOSPADM

## 2018-04-25 RX ORDER — BISACODYL 10 MG
10 SUPPOSITORY, RECTAL RECTAL DAILY PRN
Status: DISCONTINUED | OUTPATIENT
Start: 2018-04-26 | End: 2018-04-27 | Stop reason: HOSPADM

## 2018-04-25 RX ORDER — ONDANSETRON 2 MG/ML
4 INJECTION INTRAMUSCULAR; INTRAVENOUS EVERY 6 HOURS PRN
Status: DISCONTINUED | OUTPATIENT
Start: 2018-04-25 | End: 2018-04-25 | Stop reason: HOSPADM

## 2018-04-25 RX ORDER — ONDANSETRON 4 MG/1
4 TABLET, FILM COATED ORAL EVERY 6 HOURS PRN
Status: DISCONTINUED | OUTPATIENT
Start: 2018-04-25 | End: 2018-04-27 | Stop reason: HOSPADM

## 2018-04-25 RX ORDER — ACETAMINOPHEN 325 MG/1
650 TABLET ORAL EVERY 4 HOURS PRN
Status: DISCONTINUED | OUTPATIENT
Start: 2018-04-25 | End: 2018-04-27 | Stop reason: HOSPADM

## 2018-04-25 RX ORDER — SODIUM CHLORIDE, SODIUM LACTATE, POTASSIUM CHLORIDE, CALCIUM CHLORIDE 600; 310; 30; 20 MG/100ML; MG/100ML; MG/100ML; MG/100ML
125 INJECTION, SOLUTION INTRAVENOUS CONTINUOUS
Status: DISCONTINUED | OUTPATIENT
Start: 2018-04-25 | End: 2018-04-26

## 2018-04-25 RX ORDER — BUTORPHANOL TARTRATE 1 MG/ML
1 INJECTION, SOLUTION INTRAMUSCULAR; INTRAVENOUS
Status: DISCONTINUED | OUTPATIENT
Start: 2018-04-25 | End: 2018-04-25 | Stop reason: HOSPADM

## 2018-04-25 RX ADMIN — OXYTOCIN 2 MILLI-UNITS/MIN: 10 INJECTION INTRAVENOUS at 15:00

## 2018-04-25 RX ADMIN — AMPICILLIN SODIUM 1 G: 1 INJECTION, POWDER, FOR SOLUTION INTRAMUSCULAR; INTRAVENOUS at 17:51

## 2018-04-25 RX ADMIN — BENZOCAINE AND LEVOMENTHOL: 200; 5 SPRAY TOPICAL at 21:29

## 2018-04-25 RX ADMIN — SODIUM CHLORIDE, POTASSIUM CHLORIDE, SODIUM LACTATE AND CALCIUM CHLORIDE 1000 ML: 600; 310; 30; 20 INJECTION, SOLUTION INTRAVENOUS at 13:53

## 2018-04-25 RX ADMIN — LIDOCAINE HYDROCHLORIDE 10 ML: 20 INJECTION, SOLUTION EPIDURAL; INFILTRATION; INTRACAUDAL; PERINEURAL at 15:51

## 2018-04-25 RX ADMIN — IBUPROFEN 800 MG: 800 TABLET ORAL at 21:28

## 2018-04-25 RX ADMIN — BENZOCAINE 1 APPLICATION: 5.6 OINTMENT TOPICAL at 21:30

## 2018-04-25 RX ADMIN — MISOPROSTOL 600 MCG: 100 TABLET ORAL at 19:00

## 2018-04-25 RX ADMIN — FENTANYL CIT 0.2 MG/100ML-ROPIV 0.2%-NACL 0.9% EPIDURAL INJ 14 ML/HR: 2/0.2 SOLUTION at 15:54

## 2018-04-25 RX ADMIN — AMPICILLIN SODIUM 2 G: 2 INJECTION, POWDER, FOR SOLUTION INTRAMUSCULAR; INTRAVENOUS at 13:53

## 2018-04-25 RX ADMIN — WITCH HAZEL 1 PAD: 500 SOLUTION RECTAL; TOPICAL at 21:29

## 2018-04-25 RX ADMIN — HYDROCORTISONE 2.5% 1 APPLICATION: 25 CREAM TOPICAL at 21:29

## 2018-04-25 RX ADMIN — LIDOCAINE HYDROCHLORIDE AND EPINEPHRINE 3 ML: 15; 5 INJECTION, SOLUTION EPIDURAL at 15:49

## 2018-04-26 LAB
BASOPHILS # BLD AUTO: 0.02 10*3/MM3 (ref 0–0.3)
BASOPHILS NFR BLD AUTO: 0.2 % (ref 0–2)
DEPRECATED RDW RBC AUTO: 45.8 FL (ref 37–54)
EOSINOPHIL # BLD AUTO: 0.14 10*3/MM3 (ref 0–0.7)
EOSINOPHIL NFR BLD AUTO: 1.4 % (ref 0–5)
ERYTHROCYTE [DISTWIDTH] IN BLOOD BY AUTOMATED COUNT: 13.9 % (ref 11.5–14.5)
HCT VFR BLD AUTO: 35.8 % (ref 37–47)
HGB BLD-MCNC: 11.9 G/DL (ref 12–16)
IMM GRANULOCYTES # BLD: 0.03 10*3/MM3 (ref 0–0.03)
IMM GRANULOCYTES NFR BLD: 0.3 % (ref 0–0.5)
LYMPHOCYTES # BLD AUTO: 1.59 10*3/MM3 (ref 1–3)
LYMPHOCYTES NFR BLD AUTO: 15.4 % (ref 21–51)
MCH RBC QN AUTO: 30.5 PG (ref 27–33)
MCHC RBC AUTO-ENTMCNC: 33.2 G/DL (ref 33–37)
MCV RBC AUTO: 91.8 FL (ref 80–94)
MONOCYTES # BLD AUTO: 0.85 10*3/MM3 (ref 0.1–0.9)
MONOCYTES NFR BLD AUTO: 8.2 % (ref 0–10)
NEUTROPHILS # BLD AUTO: 7.72 10*3/MM3 (ref 1.4–6.5)
NEUTROPHILS NFR BLD AUTO: 74.5 % (ref 30–70)
PLATELET # BLD AUTO: 122 10*3/MM3 (ref 130–400)
PMV BLD AUTO: 12.7 FL (ref 6–10)
RBC # BLD AUTO: 3.9 10*6/MM3 (ref 4.2–5.4)
WBC NRBC COR # BLD: 10.35 10*3/MM3 (ref 4.5–12.5)

## 2018-04-26 PROCEDURE — 85025 COMPLETE CBC W/AUTO DIFF WBC: CPT | Performed by: OBSTETRICS & GYNECOLOGY

## 2018-04-26 RX ORDER — MISOPROSTOL 100 UG/1
800 TABLET ORAL AS NEEDED
Status: DISCONTINUED | OUTPATIENT
Start: 2018-04-26 | End: 2018-04-27 | Stop reason: HOSPADM

## 2018-04-26 RX ORDER — METHYLERGONOVINE MALEATE 0.2 MG/ML
200 INJECTION INTRAVENOUS ONCE AS NEEDED
Status: DISCONTINUED | OUTPATIENT
Start: 2018-04-26 | End: 2018-04-27 | Stop reason: HOSPADM

## 2018-04-26 RX ORDER — CARBOPROST TROMETHAMINE 250 UG/ML
250 INJECTION, SOLUTION INTRAMUSCULAR AS NEEDED
Status: DISCONTINUED | OUTPATIENT
Start: 2018-04-26 | End: 2018-04-27 | Stop reason: HOSPADM

## 2018-04-26 RX ORDER — OXYTOCIN/RINGER'S LACTATE 20/1000 ML
1-2 PLASTIC BAG, INJECTION (ML) INTRAVENOUS
Status: DISCONTINUED | OUTPATIENT
Start: 2018-04-26 | End: 2018-04-26

## 2018-04-26 RX ADMIN — Medication: at 08:03

## 2018-04-26 RX ADMIN — IBUPROFEN 800 MG: 800 TABLET ORAL at 06:33

## 2018-04-26 RX ADMIN — IBUPROFEN 800 MG: 800 TABLET ORAL at 13:08

## 2018-04-26 RX ADMIN — FERROUS SULFATE TAB 325 MG (65 MG ELEMENTAL FE) 325 MG: 325 (65 FE) TAB at 08:03

## 2018-04-26 RX ADMIN — PRENATAL VIT W/ FE FUMARATE-FA TAB 27-0.8 MG 1 TABLET: 27-0.8 TAB at 08:03

## 2018-04-26 RX ADMIN — IBUPROFEN 800 MG: 800 TABLET ORAL at 20:43

## 2018-04-26 RX ADMIN — DOCUSATE SODIUM 100 MG: 100 CAPSULE, LIQUID FILLED ORAL at 08:03

## 2018-04-27 VITALS
HEIGHT: 62 IN | RESPIRATION RATE: 18 BRPM | SYSTOLIC BLOOD PRESSURE: 143 MMHG | TEMPERATURE: 98.1 F | WEIGHT: 156 LBS | DIASTOLIC BLOOD PRESSURE: 75 MMHG | HEART RATE: 79 BPM | BODY MASS INDEX: 28.71 KG/M2

## 2018-04-27 PROBLEM — O42.90 DELAYED DELIVERY AFTER SROM (SPONTANEOUS RUPTURE OF MEMBRANES): Status: RESOLVED | Noted: 2018-04-25 | Resolved: 2018-04-27

## 2018-04-27 PROBLEM — Z34.90 PREGNANCY: Status: RESOLVED | Noted: 2018-03-29 | Resolved: 2018-04-27

## 2018-04-27 PROBLEM — IMO0002 RUPTURE OF MEMBRANES WITH CLEAR AMNIOTIC FLUID: Status: RESOLVED | Noted: 2018-04-25 | Resolved: 2018-04-27

## 2018-04-27 RX ORDER — IBUPROFEN 800 MG/1
800 TABLET ORAL 3 TIMES DAILY
Qty: 30 TABLET | Refills: 0 | Status: ON HOLD | OUTPATIENT
Start: 2018-04-27 | End: 2019-09-13

## 2018-04-27 RX ADMIN — DOCUSATE SODIUM 100 MG: 100 CAPSULE, LIQUID FILLED ORAL at 08:17

## 2018-04-27 RX ADMIN — IBUPROFEN 800 MG: 800 TABLET ORAL at 06:12

## 2018-05-01 ENCOUNTER — APPOINTMENT (OUTPATIENT)
Dept: GENERAL RADIOLOGY | Facility: HOSPITAL | Age: 23
End: 2018-05-01

## 2018-05-01 ENCOUNTER — APPOINTMENT (OUTPATIENT)
Dept: ULTRASOUND IMAGING | Facility: HOSPITAL | Age: 23
End: 2018-05-01

## 2018-05-01 ENCOUNTER — APPOINTMENT (OUTPATIENT)
Dept: CT IMAGING | Facility: HOSPITAL | Age: 23
End: 2018-05-01

## 2018-05-01 ENCOUNTER — HOSPITAL ENCOUNTER (EMERGENCY)
Facility: HOSPITAL | Age: 23
Discharge: HOME | End: 2018-05-01
Attending: FAMILY MEDICINE | Admitting: FAMILY MEDICINE

## 2018-05-01 VITALS
WEIGHT: 146 LBS | HEART RATE: 81 BPM | BODY MASS INDEX: 26.87 KG/M2 | DIASTOLIC BLOOD PRESSURE: 73 MMHG | RESPIRATION RATE: 16 BRPM | HEIGHT: 62 IN | OXYGEN SATURATION: 100 % | TEMPERATURE: 99.4 F | SYSTOLIC BLOOD PRESSURE: 121 MMHG

## 2018-05-01 LAB
ALBUMIN SERPL-MCNC: 4 G/DL (ref 3.5–5)
ALBUMIN/GLOB SERPL: 1.3 G/DL (ref 1.5–2.5)
ALP SERPL-CCNC: 107 U/L (ref 35–104)
ALT SERPL W P-5'-P-CCNC: 32 U/L (ref 10–36)
ANION GAP SERPL CALCULATED.3IONS-SCNC: 10.4 MMOL/L (ref 3.6–11.2)
AST SERPL-CCNC: 32 U/L (ref 10–30)
BACTERIA UR QL AUTO: ABNORMAL /HPF
BASOPHILS # BLD AUTO: 0.01 10*3/MM3 (ref 0–0.3)
BASOPHILS NFR BLD AUTO: 0.1 % (ref 0–2)
BILIRUB SERPL-MCNC: 0.6 MG/DL (ref 0.2–1.8)
BILIRUB UR QL STRIP: NEGATIVE
BUN BLD-MCNC: 14 MG/DL (ref 7–21)
BUN/CREAT SERPL: 22.6 (ref 7–25)
CALCIUM SPEC-SCNC: 8.8 MG/DL (ref 7.7–10)
CHLORIDE SERPL-SCNC: 106 MMOL/L (ref 99–112)
CLARITY UR: ABNORMAL
CO2 SERPL-SCNC: 19.6 MMOL/L (ref 24.3–31.9)
COLOR UR: ABNORMAL
CREAT BLD-MCNC: 0.62 MG/DL (ref 0.43–1.29)
CRP SERPL-MCNC: 2.57 MG/DL (ref 0–0.99)
D-LACTATE SERPL-SCNC: 1.2 MMOL/L (ref 0.5–2)
DEPRECATED RDW RBC AUTO: 43.9 FL (ref 37–54)
EOSINOPHIL # BLD AUTO: 0.2 10*3/MM3 (ref 0–0.7)
EOSINOPHIL NFR BLD AUTO: 2.1 % (ref 0–5)
ERYTHROCYTE [DISTWIDTH] IN BLOOD BY AUTOMATED COUNT: 13.4 % (ref 11.5–14.5)
ERYTHROCYTE [SEDIMENTATION RATE] IN BLOOD: 9 MM/HR (ref 0–20)
GFR SERPL CREATININE-BSD FRML MDRD: 122 ML/MIN/1.73
GLOBULIN UR ELPH-MCNC: 3.1 GM/DL
GLUCOSE BLD-MCNC: 96 MG/DL (ref 70–110)
GLUCOSE UR STRIP-MCNC: NEGATIVE MG/DL
HCT VFR BLD AUTO: 41.5 % (ref 37–47)
HGB BLD-MCNC: 14.1 G/DL (ref 12–16)
HGB UR QL STRIP.AUTO: ABNORMAL
HOLD SPECIMEN: NORMAL
HOLD SPECIMEN: NORMAL
HYALINE CASTS UR QL AUTO: ABNORMAL /LPF
IMM GRANULOCYTES # BLD: 0.03 10*3/MM3 (ref 0–0.03)
IMM GRANULOCYTES NFR BLD: 0.3 % (ref 0–0.5)
KETONES UR QL STRIP: NEGATIVE
LEUKOCYTE ESTERASE UR QL STRIP.AUTO: ABNORMAL
LYMPHOCYTES # BLD AUTO: 0.4 10*3/MM3 (ref 1–3)
LYMPHOCYTES NFR BLD AUTO: 4.2 % (ref 21–51)
MCH RBC QN AUTO: 30.9 PG (ref 27–33)
MCHC RBC AUTO-ENTMCNC: 34 G/DL (ref 33–37)
MCV RBC AUTO: 91 FL (ref 80–94)
MONOCYTES # BLD AUTO: 0.47 10*3/MM3 (ref 0.1–0.9)
MONOCYTES NFR BLD AUTO: 4.9 % (ref 0–10)
NEUTROPHILS # BLD AUTO: 8.39 10*3/MM3 (ref 1.4–6.5)
NEUTROPHILS NFR BLD AUTO: 88.4 % (ref 30–70)
NITRITE UR QL STRIP: NEGATIVE
OSMOLALITY SERPL CALC.SUM OF ELEC: 272.3 MOSM/KG (ref 273–305)
PH UR STRIP.AUTO: <=5 [PH] (ref 5–8)
PLATELET # BLD AUTO: 222 10*3/MM3 (ref 130–400)
PMV BLD AUTO: 10.4 FL (ref 6–10)
POTASSIUM BLD-SCNC: 3.4 MMOL/L (ref 3.5–5.3)
PROT SERPL-MCNC: 7.1 G/DL (ref 6–8)
PROT UR QL STRIP: NEGATIVE
RBC # BLD AUTO: 4.56 10*6/MM3 (ref 4.2–5.4)
RBC # UR: ABNORMAL /HPF
REF LAB TEST METHOD: ABNORMAL
SODIUM BLD-SCNC: 136 MMOL/L (ref 135–153)
SP GR UR STRIP: 1.03 (ref 1–1.03)
SQUAMOUS #/AREA URNS HPF: ABNORMAL /HPF
TRANS CELLS #/AREA URNS HPF: ABNORMAL /HPF
UROBILINOGEN UR QL STRIP: ABNORMAL
WBC NRBC COR # BLD: 9.5 10*3/MM3 (ref 4.5–12.5)
WBC UR QL AUTO: ABNORMAL /HPF
WHOLE BLOOD HOLD SPECIMEN: NORMAL
WHOLE BLOOD HOLD SPECIMEN: NORMAL

## 2018-05-01 PROCEDURE — 87077 CULTURE AEROBIC IDENTIFY: CPT | Performed by: FAMILY MEDICINE

## 2018-05-01 PROCEDURE — 81001 URINALYSIS AUTO W/SCOPE: CPT | Performed by: FAMILY MEDICINE

## 2018-05-01 PROCEDURE — 99285 EMERGENCY DEPT VISIT HI MDM: CPT

## 2018-05-01 PROCEDURE — 87186 SC STD MICRODIL/AGAR DIL: CPT | Performed by: FAMILY MEDICINE

## 2018-05-01 PROCEDURE — 74176 CT ABD & PELVIS W/O CONTRAST: CPT

## 2018-05-01 PROCEDURE — 85025 COMPLETE CBC W/AUTO DIFF WBC: CPT | Performed by: FAMILY MEDICINE

## 2018-05-01 PROCEDURE — 87040 BLOOD CULTURE FOR BACTERIA: CPT | Performed by: FAMILY MEDICINE

## 2018-05-01 PROCEDURE — 80053 COMPREHEN METABOLIC PANEL: CPT | Performed by: FAMILY MEDICINE

## 2018-05-01 PROCEDURE — 85652 RBC SED RATE AUTOMATED: CPT | Performed by: FAMILY MEDICINE

## 2018-05-01 PROCEDURE — 96361 HYDRATE IV INFUSION ADD-ON: CPT

## 2018-05-01 PROCEDURE — 76830 TRANSVAGINAL US NON-OB: CPT | Performed by: RADIOLOGY

## 2018-05-01 PROCEDURE — 87147 CULTURE TYPE IMMUNOLOGIC: CPT | Performed by: FAMILY MEDICINE

## 2018-05-01 PROCEDURE — 71045 X-RAY EXAM CHEST 1 VIEW: CPT | Performed by: RADIOLOGY

## 2018-05-01 PROCEDURE — 96365 THER/PROPH/DIAG IV INF INIT: CPT

## 2018-05-01 PROCEDURE — 83605 ASSAY OF LACTIC ACID: CPT | Performed by: FAMILY MEDICINE

## 2018-05-01 PROCEDURE — 36415 COLL VENOUS BLD VENIPUNCTURE: CPT

## 2018-05-01 PROCEDURE — 87086 URINE CULTURE/COLONY COUNT: CPT | Performed by: FAMILY MEDICINE

## 2018-05-01 PROCEDURE — 76857 US EXAM PELVIC LIMITED: CPT | Performed by: RADIOLOGY

## 2018-05-01 PROCEDURE — 74176 CT ABD & PELVIS W/O CONTRAST: CPT | Performed by: RADIOLOGY

## 2018-05-01 PROCEDURE — 86140 C-REACTIVE PROTEIN: CPT | Performed by: FAMILY MEDICINE

## 2018-05-01 PROCEDURE — 76830 TRANSVAGINAL US NON-OB: CPT

## 2018-05-01 PROCEDURE — 71045 X-RAY EXAM CHEST 1 VIEW: CPT

## 2018-05-01 RX ORDER — AMOXICILLIN AND CLAVULANATE POTASSIUM 875; 125 MG/1; MG/1
1 TABLET, FILM COATED ORAL 2 TIMES DAILY
Qty: 20 TABLET | Refills: 0 | Status: SHIPPED | OUTPATIENT
Start: 2018-05-01 | End: 2018-05-11

## 2018-05-01 RX ORDER — CLINDAMYCIN PHOSPHATE 900 MG/50ML
900 INJECTION INTRAVENOUS ONCE
Status: COMPLETED | OUTPATIENT
Start: 2018-05-01 | End: 2018-05-01

## 2018-05-01 RX ORDER — SODIUM CHLORIDE 0.9 % (FLUSH) 0.9 %
10 SYRINGE (ML) INJECTION AS NEEDED
Status: DISCONTINUED | OUTPATIENT
Start: 2018-05-01 | End: 2018-05-02 | Stop reason: HOSPADM

## 2018-05-01 RX ORDER — ACETAMINOPHEN 500 MG
1000 TABLET ORAL ONCE
Status: COMPLETED | OUTPATIENT
Start: 2018-05-01 | End: 2018-05-01

## 2018-05-01 RX ADMIN — SODIUM CHLORIDE 1000 ML: 9 INJECTION, SOLUTION INTRAVENOUS at 21:23

## 2018-05-01 RX ADMIN — ACETAMINOPHEN 1000 MG: 500 TABLET ORAL at 21:49

## 2018-05-01 RX ADMIN — CLINDAMYCIN PHOSPHATE 900 MG: 18 INJECTION, SOLUTION INTRAVENOUS at 21:58

## 2018-05-02 NOTE — ED PROVIDER NOTES
Subjective   History of Present Illness  21 y/o F 6 days s/p  after uncomplicated pregnancy p/w 24 hours of fever/chills. No N/V/D. No dysuria/frequency/urgency. VB continued after delivery and has been gradually subsiding. No new VD. Pt denies any breast tenderness or pain.   Review of Systems   Constitutional: Positive for chills and fever. Negative for fatigue.   Eyes: Negative for photophobia and visual disturbance.   Respiratory: Negative for cough, shortness of breath and wheezing.    Cardiovascular: Negative for chest pain and palpitations.   Gastrointestinal: Positive for abdominal pain. Negative for abdominal distention, nausea and vomiting.   Genitourinary: Positive for vaginal bleeding. Negative for difficulty urinating, dysuria, flank pain, frequency, urgency and vaginal pain.   Musculoskeletal: Negative for back pain and neck pain.   Skin: Negative for color change, pallor and rash.       History reviewed. No pertinent past medical history.    No Known Allergies    History reviewed. No pertinent surgical history.    History reviewed. No pertinent family history.    Social History     Social History   • Marital status: Single     Social History Main Topics   • Smoking status: Former Smoker   • Smokeless tobacco: Never Used   • Alcohol use No   • Drug use: No   • Sexual activity: Yes     Partners: Male     Other Topics Concern   • Not on file           Objective   Physical Exam   Constitutional: She is oriented to person, place, and time. She appears well-developed and well-nourished. She is active.   HENT:   Head: Normocephalic and atraumatic.   Right Ear: Hearing, external ear and ear canal normal.   Left Ear: Hearing, external ear and ear canal normal.   Nose: Nose normal.   Mouth/Throat: Uvula is midline, oropharynx is clear and moist and mucous membranes are normal.   Eyes: Conjunctivae, EOM and lids are normal. Pupils are equal, round, and reactive to light.   Neck: Trachea normal, normal range  of motion, full passive range of motion without pain and phonation normal. Neck supple.   Cardiovascular: Normal rate, regular rhythm and normal heart sounds.    Pulmonary/Chest: Effort normal and breath sounds normal.   Abdominal: Soft. Normal appearance. There is tenderness in the right lower quadrant, suprapubic area and left lower quadrant. There is no rigidity and no guarding.   Neurological: She is alert and oriented to person, place, and time. GCS eye subscore is 4. GCS verbal subscore is 5. GCS motor subscore is 6.   Skin: Skin is warm, dry and intact. Capillary refill takes less than 2 seconds.   Psychiatric: She has a normal mood and affect. Her speech is normal and behavior is normal. Cognition and memory are normal.   Nursing note and vitals reviewed.      Procedures           ED Course  ED Course      Imaging all WNL. I spoke to Dr Luis who agreed that pt could be treated with Augmentin as an outpt and f/u in the office tomorrow. Pt told of plan and is in agreement. Pt given dose of clindamycin here in the ED and prescription for Augmentin.            MDM  Number of Diagnoses or Management Options  Postpartum fever: new and requires workup     Amount and/or Complexity of Data Reviewed  Clinical lab tests: reviewed and ordered  Tests in the radiology section of CPT®: reviewed and ordered  Discuss the patient with other providers: yes  Independent visualization of images, tracings, or specimens: yes    Risk of Complications, Morbidity, and/or Mortality  Presenting problems: high  Diagnostic procedures: high  Management options: high    Patient Progress  Patient progress: stable      ED Disposition     ED Disposition Condition Comment    Discharge Stable             Final diagnoses:   Postpartum fever            Chidi Farfan MD  05/01/18 0352

## 2018-05-02 NOTE — ED NOTES
Patient reports having a temp and being 6 days out PP. Patient states she has been having abdominal pain along with diarrhea. Patient denies any n/v.  Patient resting quietly on stretcher with no complaints. Pt AAOx4. No respiratory distress noted. Respirations even and unlabored. Pt denies any needs at this time. Skin PWD. Pt family at bedside. Will continue to monitor and follow plan of care. Bed rails up x 2, bed in lowest position, call light within reach.      Elana Hook RN  05/01/18 7750

## 2018-05-04 ENCOUNTER — TELEPHONE (OUTPATIENT)
Dept: EMERGENCY DEPT | Facility: HOSPITAL | Age: 23
End: 2018-05-04

## 2018-05-04 LAB
BACTERIA SPEC AEROBE CULT: ABNORMAL
BACTERIA SPEC AEROBE CULT: ABNORMAL
STREP GROUPING: ABNORMAL

## 2018-05-06 LAB
BACTERIA SPEC AEROBE CULT: NORMAL
BACTERIA SPEC AEROBE CULT: NORMAL

## 2019-03-18 LAB
EXTERNAL CHLAMYDIA SCREEN: NEGATIVE
EXTERNAL GONORRHEA SCREEN: NEGATIVE

## 2019-04-03 LAB
EXTERNAL ABO GROUPING: NORMAL
EXTERNAL ANTIBODY SCREEN: NEGATIVE
EXTERNAL HEPATITIS B SURFACE ANTIGEN: NEGATIVE
EXTERNAL RH FACTOR: POSITIVE
EXTERNAL RUBELLA QUALITATIVE: NORMAL
EXTERNAL SYPHILIS RPR SCREEN: NORMAL
HIV1 P24 AG SERPL QL IA: NORMAL

## 2019-08-14 LAB — EXTERNAL GROUP B STREP ANTIGEN: NEGATIVE

## 2019-09-13 ENCOUNTER — HOSPITAL ENCOUNTER (OUTPATIENT)
Dept: LABOR AND DELIVERY | Facility: HOSPITAL | Age: 24
Discharge: HOME | End: 2019-09-13

## 2019-09-13 ENCOUNTER — ANESTHESIA EVENT (OUTPATIENT)
Dept: LABOR AND DELIVERY | Facility: HOSPITAL | Age: 24
End: 2019-09-13

## 2019-09-13 ENCOUNTER — HOSPITAL ENCOUNTER (INPATIENT)
Facility: HOSPITAL | Age: 24
LOS: 2 days | Discharge: HOME | End: 2019-09-15
Attending: OBSTETRICS & GYNECOLOGY | Admitting: OBSTETRICS & GYNECOLOGY

## 2019-09-13 ENCOUNTER — ANESTHESIA (OUTPATIENT)
Dept: LABOR AND DELIVERY | Facility: HOSPITAL | Age: 24
End: 2019-09-13

## 2019-09-13 PROBLEM — Z34.90 PREGNANT: Status: ACTIVE | Noted: 2019-09-13

## 2019-09-13 LAB
ABO GROUP BLD: NORMAL
BASOPHILS # BLD AUTO: 0.01 10*3/MM3 (ref 0–0.2)
BASOPHILS NFR BLD AUTO: 0.1 % (ref 0–1.5)
BLD GP AB SCN SERPL QL: NEGATIVE
DEPRECATED RDW RBC AUTO: 45.6 FL (ref 37–54)
EOSINOPHIL # BLD AUTO: 0.15 10*3/MM3 (ref 0–0.4)
EOSINOPHIL NFR BLD AUTO: 1.4 % (ref 0.3–6.2)
ERYTHROCYTE [DISTWIDTH] IN BLOOD BY AUTOMATED COUNT: 15.7 % (ref 12.3–15.4)
HCT VFR BLD AUTO: 30.4 % (ref 34–46.6)
HGB BLD-MCNC: 9.6 G/DL (ref 12–15.9)
IMM GRANULOCYTES # BLD AUTO: 0.03 10*3/MM3 (ref 0–0.05)
IMM GRANULOCYTES NFR BLD AUTO: 0.3 % (ref 0–0.5)
LYMPHOCYTES # BLD AUTO: 2.12 10*3/MM3 (ref 0.7–3.1)
LYMPHOCYTES NFR BLD AUTO: 20.3 % (ref 19.6–45.3)
MCH RBC QN AUTO: 25.4 PG (ref 26.6–33)
MCHC RBC AUTO-ENTMCNC: 31.6 G/DL (ref 31.5–35.7)
MCV RBC AUTO: 80.4 FL (ref 79–97)
MONOCYTES # BLD AUTO: 1.16 10*3/MM3 (ref 0.1–0.9)
MONOCYTES NFR BLD AUTO: 11.1 % (ref 5–12)
NEUTROPHILS # BLD AUTO: 6.96 10*3/MM3 (ref 1.7–7)
NEUTROPHILS NFR BLD AUTO: 66.8 % (ref 42.7–76)
PLATELET # BLD AUTO: 165 10*3/MM3 (ref 140–450)
PMV BLD AUTO: 11.7 FL (ref 6–12)
RBC # BLD AUTO: 3.78 10*6/MM3 (ref 3.77–5.28)
RH BLD: POSITIVE
T&S EXPIRATION DATE: NORMAL
WBC NRBC COR # BLD: 10.43 10*3/MM3 (ref 3.4–10.8)

## 2019-09-13 PROCEDURE — C1755 CATHETER, INTRASPINAL: HCPCS | Performed by: NURSE ANESTHETIST, CERTIFIED REGISTERED

## 2019-09-13 PROCEDURE — 4A1HX4Z MONITORING OF PRODUCTS OF CONCEPTION, CARDIAC ELECTRICAL ACTIVITY, EXTERNAL APPROACH: ICD-10-PCS | Performed by: OBSTETRICS & GYNECOLOGY

## 2019-09-13 PROCEDURE — 3E033VJ INTRODUCTION OF OTHER HORMONE INTO PERIPHERAL VEIN, PERCUTANEOUS APPROACH: ICD-10-PCS | Performed by: OBSTETRICS & GYNECOLOGY

## 2019-09-13 PROCEDURE — 59025 FETAL NON-STRESS TEST: CPT

## 2019-09-13 PROCEDURE — 86850 RBC ANTIBODY SCREEN: CPT | Performed by: OBSTETRICS & GYNECOLOGY

## 2019-09-13 PROCEDURE — 86901 BLOOD TYPING SEROLOGIC RH(D): CPT | Performed by: OBSTETRICS & GYNECOLOGY

## 2019-09-13 PROCEDURE — 10907ZC DRAINAGE OF AMNIOTIC FLUID, THERAPEUTIC FROM PRODUCTS OF CONCEPTION, VIA NATURAL OR ARTIFICIAL OPENING: ICD-10-PCS | Performed by: OBSTETRICS & GYNECOLOGY

## 2019-09-13 PROCEDURE — 36415 COLL VENOUS BLD VENIPUNCTURE: CPT | Performed by: OBSTETRICS & GYNECOLOGY

## 2019-09-13 PROCEDURE — 86900 BLOOD TYPING SEROLOGIC ABO: CPT | Performed by: OBSTETRICS & GYNECOLOGY

## 2019-09-13 PROCEDURE — 25010000002 FENTANYL CITRATE (PF) 100 MCG/2ML SOLUTION: Performed by: NURSE ANESTHETIST, CERTIFIED REGISTERED

## 2019-09-13 PROCEDURE — 85025 COMPLETE CBC W/AUTO DIFF WBC: CPT | Performed by: OBSTETRICS & GYNECOLOGY

## 2019-09-13 PROCEDURE — C1755 CATHETER, INTRASPINAL: HCPCS

## 2019-09-13 RX ORDER — SODIUM CHLORIDE, SODIUM LACTATE, POTASSIUM CHLORIDE, CALCIUM CHLORIDE 600; 310; 30; 20 MG/100ML; MG/100ML; MG/100ML; MG/100ML
125 INJECTION, SOLUTION INTRAVENOUS CONTINUOUS
Status: DISCONTINUED | OUTPATIENT
Start: 2019-09-13 | End: 2019-09-15 | Stop reason: HOSPADM

## 2019-09-13 RX ORDER — MINERAL OIL
30 OIL (ML) MISCELLANEOUS AS NEEDED
Status: DISCONTINUED | OUTPATIENT
Start: 2019-09-13 | End: 2019-09-13 | Stop reason: HOSPADM

## 2019-09-13 RX ORDER — BUPIVACAINE HYDROCHLORIDE 5 MG/ML
INJECTION, SOLUTION EPIDURAL; INTRACAUDAL AS NEEDED
Status: DISCONTINUED | OUTPATIENT
Start: 2019-09-13 | End: 2019-09-17 | Stop reason: SURG

## 2019-09-13 RX ORDER — EPHEDRINE SULFATE 50 MG/ML
10 INJECTION, SOLUTION INTRAVENOUS
Status: DISCONTINUED | OUTPATIENT
Start: 2019-09-13 | End: 2019-09-13 | Stop reason: HOSPADM

## 2019-09-13 RX ORDER — HYDROCODONE BITARTRATE AND ACETAMINOPHEN 5; 325 MG/1; MG/1
1 TABLET ORAL EVERY 4 HOURS PRN
Status: DISCONTINUED | OUTPATIENT
Start: 2019-09-13 | End: 2019-09-15 | Stop reason: HOSPADM

## 2019-09-13 RX ORDER — OXYTOCIN-SODIUM CHLORIDE 0.9% IV SOLN 30 UNIT/500ML 30-0.9/5 UT/ML-%
999 SOLUTION INTRAVENOUS ONCE
Status: DISCONTINUED | OUTPATIENT
Start: 2019-09-13 | End: 2019-09-13 | Stop reason: HOSPADM

## 2019-09-13 RX ORDER — PRENATAL VIT/IRON FUM/FOLIC AC 27MG-0.8MG
1 TABLET ORAL DAILY
Status: DISCONTINUED | OUTPATIENT
Start: 2019-09-14 | End: 2019-09-15 | Stop reason: HOSPADM

## 2019-09-13 RX ORDER — ACETAMINOPHEN 500 MG
500 TABLET ORAL EVERY 6 HOURS PRN
Status: CANCELLED | OUTPATIENT
Start: 2019-09-13

## 2019-09-13 RX ORDER — ONDANSETRON 2 MG/ML
4 INJECTION INTRAMUSCULAR; INTRAVENOUS EVERY 6 HOURS PRN
Status: DISCONTINUED | OUTPATIENT
Start: 2019-09-13 | End: 2019-09-13 | Stop reason: HOSPADM

## 2019-09-13 RX ORDER — ONDANSETRON 4 MG/1
4 TABLET, FILM COATED ORAL EVERY 6 HOURS PRN
Status: DISCONTINUED | OUTPATIENT
Start: 2019-09-13 | End: 2019-09-13 | Stop reason: HOSPADM

## 2019-09-13 RX ORDER — MISOPROSTOL 100 UG/1
600 TABLET ORAL AS NEEDED
Status: DISCONTINUED | OUTPATIENT
Start: 2019-09-13 | End: 2019-09-13 | Stop reason: HOSPADM

## 2019-09-13 RX ORDER — BISACODYL 10 MG
10 SUPPOSITORY, RECTAL RECTAL DAILY PRN
Status: DISCONTINUED | OUTPATIENT
Start: 2019-09-14 | End: 2019-09-15 | Stop reason: HOSPADM

## 2019-09-13 RX ORDER — IBUPROFEN 800 MG/1
800 TABLET ORAL EVERY 8 HOURS SCHEDULED
Status: DISCONTINUED | OUTPATIENT
Start: 2019-09-13 | End: 2019-09-13

## 2019-09-13 RX ORDER — LIDOCAINE HYDROCHLORIDE 20 MG/ML
INJECTION, SOLUTION EPIDURAL; INFILTRATION; INTRACAUDAL; PERINEURAL AS NEEDED
Status: DISCONTINUED | OUTPATIENT
Start: 2019-09-13 | End: 2019-09-17 | Stop reason: SURG

## 2019-09-13 RX ORDER — ONDANSETRON 4 MG/1
4 TABLET, FILM COATED ORAL EVERY 8 HOURS PRN
Status: DISCONTINUED | OUTPATIENT
Start: 2019-09-13 | End: 2019-09-15 | Stop reason: HOSPADM

## 2019-09-13 RX ORDER — SODIUM CHLORIDE 0.9 % (FLUSH) 0.9 %
1-10 SYRINGE (ML) INJECTION AS NEEDED
Status: DISCONTINUED | OUTPATIENT
Start: 2019-09-13 | End: 2019-09-15 | Stop reason: HOSPADM

## 2019-09-13 RX ORDER — MAGNESIUM HYDROXIDE 1200 MG/15ML
3000 LIQUID ORAL ONCE AS NEEDED
Status: DISCONTINUED | OUTPATIENT
Start: 2019-09-13 | End: 2019-09-13 | Stop reason: HOSPADM

## 2019-09-13 RX ORDER — CARBOPROST TROMETHAMINE 250 UG/ML
250 INJECTION, SOLUTION INTRAMUSCULAR AS NEEDED
Status: DISCONTINUED | OUTPATIENT
Start: 2019-09-13 | End: 2019-09-13 | Stop reason: HOSPADM

## 2019-09-13 RX ORDER — BUTORPHANOL TARTRATE 1 MG/ML
1 INJECTION, SOLUTION INTRAMUSCULAR; INTRAVENOUS
Status: DISCONTINUED | OUTPATIENT
Start: 2019-09-13 | End: 2019-09-13 | Stop reason: HOSPADM

## 2019-09-13 RX ORDER — FAMOTIDINE 10 MG/ML
20 INJECTION, SOLUTION INTRAVENOUS ONCE AS NEEDED
Status: DISCONTINUED | OUTPATIENT
Start: 2019-09-13 | End: 2019-09-13 | Stop reason: HOSPADM

## 2019-09-13 RX ORDER — DOCUSATE SODIUM 100 MG/1
100 CAPSULE, LIQUID FILLED ORAL DAILY
Status: DISCONTINUED | OUTPATIENT
Start: 2019-09-13 | End: 2019-09-15 | Stop reason: HOSPADM

## 2019-09-13 RX ORDER — OXYTOCIN-SODIUM CHLORIDE 0.9% IV SOLN 30 UNIT/500ML 30-0.9/5 UT/ML-%
2-30 SOLUTION INTRAVENOUS
Status: DISCONTINUED | OUTPATIENT
Start: 2019-09-13 | End: 2019-09-13 | Stop reason: HOSPADM

## 2019-09-13 RX ORDER — ONDANSETRON 2 MG/ML
4 INJECTION INTRAMUSCULAR; INTRAVENOUS EVERY 6 HOURS PRN
Status: DISCONTINUED | OUTPATIENT
Start: 2019-09-13 | End: 2019-09-15 | Stop reason: HOSPADM

## 2019-09-13 RX ORDER — FENTANYL CIT 0.2 MG/100ML-ROPIV 0.2%-NACL 0.9% EPIDURAL INJ 2/0.2 MCG/ML-%
12 SOLUTION INJECTION CONTINUOUS
Status: DISCONTINUED | OUTPATIENT
Start: 2019-09-13 | End: 2019-09-15 | Stop reason: HOSPADM

## 2019-09-13 RX ORDER — ACETAMINOPHEN 325 MG/1
650 TABLET ORAL EVERY 4 HOURS PRN
Status: DISCONTINUED | OUTPATIENT
Start: 2019-09-13 | End: 2019-09-13 | Stop reason: HOSPADM

## 2019-09-13 RX ORDER — METHYLERGONOVINE MALEATE 0.2 MG/ML
200 INJECTION INTRAVENOUS ONCE AS NEEDED
Status: DISCONTINUED | OUTPATIENT
Start: 2019-09-13 | End: 2019-09-13 | Stop reason: HOSPADM

## 2019-09-13 RX ORDER — LANOLIN 100 %
OINTMENT (GRAM) TOPICAL
Status: DISCONTINUED | OUTPATIENT
Start: 2019-09-13 | End: 2019-09-15 | Stop reason: HOSPADM

## 2019-09-13 RX ORDER — BUPIVACAINE HYDROCHLORIDE 5 MG/ML
INJECTION, SOLUTION EPIDURAL; INTRACAUDAL
Status: COMPLETED
Start: 2019-09-13 | End: 2019-09-13

## 2019-09-13 RX ORDER — OXYTOCIN-SODIUM CHLORIDE 0.9% IV SOLN 30 UNIT/500ML 30-0.9/5 UT/ML-%
250 SOLUTION INTRAVENOUS CONTINUOUS
Status: ACTIVE | OUTPATIENT
Start: 2019-09-13 | End: 2019-09-13

## 2019-09-13 RX ORDER — ONDANSETRON 2 MG/ML
4 INJECTION INTRAMUSCULAR; INTRAVENOUS ONCE AS NEEDED
Status: DISCONTINUED | OUTPATIENT
Start: 2019-09-13 | End: 2019-09-13 | Stop reason: HOSPADM

## 2019-09-13 RX ORDER — FENTANYL CITRATE 50 UG/ML
INJECTION, SOLUTION INTRAMUSCULAR; INTRAVENOUS
Status: COMPLETED
Start: 2019-09-13 | End: 2019-09-13

## 2019-09-13 RX ORDER — LIDOCAINE HYDROCHLORIDE AND EPINEPHRINE 15; 5 MG/ML; UG/ML
INJECTION, SOLUTION EPIDURAL AS NEEDED
Status: DISCONTINUED | OUTPATIENT
Start: 2019-09-13 | End: 2019-09-17 | Stop reason: SURG

## 2019-09-13 RX ORDER — FENTANYL CIT 0.2 MG/100ML-ROPIV 0.2%-NACL 0.9% EPIDURAL INJ 2/0.2 MCG/ML-%
SOLUTION INJECTION
Status: COMPLETED
Start: 2019-09-13 | End: 2019-09-13

## 2019-09-13 RX ORDER — MISOPROSTOL 100 UG/1
600 TABLET ORAL ONCE AS NEEDED
Status: DISCONTINUED | OUTPATIENT
Start: 2019-09-13 | End: 2019-09-15 | Stop reason: HOSPADM

## 2019-09-13 RX ORDER — ACETAMINOPHEN 500 MG
500 TABLET ORAL EVERY 6 HOURS PRN
COMMUNITY

## 2019-09-13 RX ORDER — FENTANYL CITRATE 50 UG/ML
100 INJECTION, SOLUTION INTRAMUSCULAR; INTRAVENOUS ONCE
Status: COMPLETED | OUTPATIENT
Start: 2019-09-13 | End: 2019-09-13

## 2019-09-13 RX ORDER — SODIUM CHLORIDE 0.9 % (FLUSH) 0.9 %
3-10 SYRINGE (ML) INJECTION AS NEEDED
Status: DISCONTINUED | OUTPATIENT
Start: 2019-09-13 | End: 2019-09-13 | Stop reason: HOSPADM

## 2019-09-13 RX ORDER — TERBUTALINE SULFATE 1 MG/ML
0.25 INJECTION, SOLUTION SUBCUTANEOUS AS NEEDED
Status: DISCONTINUED | OUTPATIENT
Start: 2019-09-13 | End: 2019-09-13 | Stop reason: HOSPADM

## 2019-09-13 RX ORDER — IBUPROFEN 800 MG/1
800 TABLET ORAL 3 TIMES DAILY
Status: DISCONTINUED | OUTPATIENT
Start: 2019-09-13 | End: 2019-09-15 | Stop reason: HOSPADM

## 2019-09-13 RX ORDER — LIDOCAINE HYDROCHLORIDE 20 MG/ML
INJECTION, SOLUTION EPIDURAL; INFILTRATION; INTRACAUDAL; PERINEURAL
Status: COMPLETED
Start: 2019-09-13 | End: 2019-09-13

## 2019-09-13 RX ORDER — OXYTOCIN-SODIUM CHLORIDE 0.9% IV SOLN 30 UNIT/500ML 30-0.9/5 UT/ML-%
125 SOLUTION INTRAVENOUS CONTINUOUS PRN
Status: COMPLETED | OUTPATIENT
Start: 2019-09-13 | End: 2019-09-13

## 2019-09-13 RX ADMIN — OXYTOCIN 125 ML/HR: 10 INJECTION INTRAVENOUS at 16:00

## 2019-09-13 RX ADMIN — SODIUM CHLORIDE, POTASSIUM CHLORIDE, SODIUM LACTATE AND CALCIUM CHLORIDE 125 ML/HR: 600; 310; 30; 20 INJECTION, SOLUTION INTRAVENOUS at 07:10

## 2019-09-13 RX ADMIN — HYDROCODONE BITARTRATE AND ACETAMINOPHEN 1 TABLET: 5; 325 TABLET ORAL at 20:10

## 2019-09-13 RX ADMIN — LIDOCAINE HYDROCHLORIDE AND EPINEPHRINE 3 ML: 15; 5 INJECTION, SOLUTION EPIDURAL at 10:51

## 2019-09-13 RX ADMIN — LIDOCAINE HYDROCHLORIDE AND EPINEPHRINE 3 ML: 15; 5 INJECTION, SOLUTION EPIDURAL at 12:07

## 2019-09-13 RX ADMIN — OXYTOCIN 2 MILLI-UNITS/MIN: 10 INJECTION INTRAVENOUS at 07:30

## 2019-09-13 RX ADMIN — SODIUM CHLORIDE, POTASSIUM CHLORIDE, SODIUM LACTATE AND CALCIUM CHLORIDE 125 ML/HR: 600; 310; 30; 20 INJECTION, SOLUTION INTRAVENOUS at 10:47

## 2019-09-13 RX ADMIN — SODIUM CHLORIDE, POTASSIUM CHLORIDE, SODIUM LACTATE AND CALCIUM CHLORIDE 1000 ML: 600; 310; 30; 20 INJECTION, SOLUTION INTRAVENOUS at 10:15

## 2019-09-13 RX ADMIN — FENTANYL CITRATE 100 MCG: 50 INJECTION, SOLUTION INTRAMUSCULAR; INTRAVENOUS at 10:54

## 2019-09-13 RX ADMIN — IBUPROFEN 800 MG: 800 TABLET, FILM COATED ORAL at 20:10

## 2019-09-13 RX ADMIN — Medication: at 20:05

## 2019-09-13 RX ADMIN — BENZOCAINE AND LEVOMENTHOL: 200; 5 SPRAY TOPICAL at 20:05

## 2019-09-13 RX ADMIN — WITCH HAZEL 1 PAD: 500 SOLUTION RECTAL; TOPICAL at 20:05

## 2019-09-13 RX ADMIN — ACETAMINOPHEN 650 MG: 325 TABLET ORAL at 16:24

## 2019-09-13 RX ADMIN — BENZOCAINE 1 APPLICATION: 5.6 OINTMENT TOPICAL at 20:05

## 2019-09-13 RX ADMIN — LIDOCAINE HYDROCHLORIDE 5 ML: 20 INJECTION, SOLUTION EPIDURAL; INFILTRATION; INTRACAUDAL; PERINEURAL at 10:54

## 2019-09-13 RX ADMIN — BUPIVACAINE HYDROCHLORIDE 5 ML: 5 INJECTION, SOLUTION EPIDURAL; INTRACAUDAL; PERINEURAL at 12:10

## 2019-09-13 RX ADMIN — FENTANYL CIT 0.2 MG/100ML-ROPIV 0.2%-NACL 0.9% EPIDURAL INJ 12 ML/HR: 2/0.2 SOLUTION at 10:54

## 2019-09-13 NOTE — ANESTHESIA PROCEDURE NOTES
Labor Epidural      Patient location during procedure: OB  Start Time: 9/13/2019 12:01 PM  Stop Time: 9/13/2019 12:06 PM  Indication:at surgeon's request  Performed By  CRNA: Michael Gonsales CRNA  Preanesthetic Checklist  Completed: patient identified, site marked, surgical consent, pre-op evaluation, timeout performed, IV checked, risks and benefits discussed and monitors and equipment checked  Prep:  Pt Position:sitting  Sterile Tech:cap, gloves, mask and sterile barrier  Prep:povidone-iodine 7.5% surgical scrub  Monitoring:blood pressure monitoring and continuous pulse oximetry  Epidural Block Procedure:  Approach:midline  Guidance:landmark technique  Location:L4-L5  Needle Type:Tuohy  Needle Gauge:18 G  Loss of Resistance Medium: saline  Loss of Resistance: 5cm  Cath Depth at skin:11 cm  Paresthesia: none  Aspiration:negative  Test Dose:negative  Number of Attempts: 1  Post Assessment:  Dressing:secured with tape and occlusive dressing applied  Pt Tolerance:patient tolerated the procedure well with no apparent complications  Complications:no

## 2019-09-13 NOTE — PLAN OF CARE
Problem: Postpartum (Vaginal Delivery) (Adult,Obstetrics,Pediatric)  Goal: Signs and Symptoms of Listed Potential Problems Will be Absent, Minimized or Managed (Postpartum)  Outcome: Ongoing (interventions implemented as appropriate)   09/13/19 2226   Goal/Outcome Evaluation   Problems Assessed (Postpartum Vaginal Delivery) all   Problems Present (Postpartum Vag Deliv) none

## 2019-09-13 NOTE — ANESTHESIA PREPROCEDURE EVALUATION
Anesthesia Evaluation     Patient summary reviewed and Nursing notes reviewed   no history of anesthetic complications:  NPO Solid Status: > 8 hours  NPO Liquid Status: > 8 hours           Airway   Mallampati: I  TM distance: >3 FB  Neck ROM: full  No difficulty expected  Dental - normal exam     Pulmonary - negative pulmonary ROS and normal exam   Cardiovascular - negative cardio ROS and normal exam        Neuro/Psych- negative ROS  GI/Hepatic/Renal/Endo - negative ROS     Musculoskeletal (-) negative ROS    Abdominal  - normal exam    Bowel sounds: normal.   Substance History - negative use     OB/GYN    (+) Pregnant,         Other                        Anesthesia Plan    ASA 2     epidural     Anesthetic plan, all risks, benefits, and alternatives have been provided, discussed and informed consent has been obtained with: patient.

## 2019-09-13 NOTE — NON STRESS TEST
Kanwal Mayorga, a  at 40w2d with an HARI of 2019, by Ultrasound, was seen at [facility] for a nonstress test.    Chief Complaint   Patient presents with   • Scheduled Induction       Patient Active Problem List   Diagnosis   • Pregnant       Start Time: 715  Stop Time: 730    Interpretation A  Nonstress Test Interpretation A: Reactive (1946 : Candace Amaro RN)  Comments A: confirmed with QUINCY Paz RN (1945 : Candace Amaro RN)      FETAL NONSTRESS TEST REPORT      Gestational age: As recorded in hospital chart    Indication: See hospital chart    Accelerations: Present    Baseline fetal heart rate: 150    Decelerations: Few variables present    Conclusion: Reactive

## 2019-09-13 NOTE — ANESTHESIA PROCEDURE NOTES
Labor Epidural      Patient location during procedure: OB  Start Time: 9/13/2019 10:45 AM  Stop Time: 9/13/2019 10:51 AM  Indication:at surgeon's request  Performed By  CRNA: Michael Gonsales CRNA  Preanesthetic Checklist  Completed: patient identified, site marked, surgical consent, pre-op evaluation, timeout performed, IV checked, risks and benefits discussed and monitors and equipment checked  Prep:  Pt Position:sitting  Sterile Tech:cap, gloves, mask and sterile barrier  Prep:povidone-iodine 7.5% surgical scrub  Monitoring:blood pressure monitoring and continuous pulse oximetry  Epidural Block Procedure:  Approach:midline  Guidance:landmark technique  Location:L3-L4  Needle Type:Tuohy  Needle Gauge:18 G  Loss of Resistance Medium: saline  Loss of Resistance: 4cm  Cath Depth at skin:10 cm  Paresthesia: none  Aspiration:negative  Test Dose:negative  Number of Attempts: 1  Post Assessment:  Dressing:secured with tape and occlusive dressing applied  Pt Tolerance:patient tolerated the procedure well with no apparent complications  Complications:no

## 2019-09-13 NOTE — H&P
HISTORY    Chief complaint  Here for induction, post term pregnancy    History of present illness  This is a 23-year old G5, P2 patient who is past her EDC, and she is admitted for induction.    Past medical, surgical, obstetrical history  See attached prenatal record    Family history  See attached prenatal record    Social history  See attached prenatal record    Functional inquiry  See attached prenatal record                                                   PHYSICAL EXAMINATION    General  In no acute distress    HEENT  Throat and ears are clear, no masses or nodes were palpable    CVR  Heart sounds are normal with no murmurs, chest is clear to IPPA    GI/  Abdomen is soft with no masses tenderness or organomegaly.  Uterine size is commensurate with her dates.  Cervix is as noted elsewhere in chart    MS  No gross bone or joint abnormalities.                                                                    IMPRESSION    Post term pregnancy for induction                                                                TREATMENT PLAN    I ruptured her membranes for clear amniotic fluid.  We have Pitocin running.  Expect spontaneous vaginal delivery.  Kanwal Mayorga, October 23, 1995

## 2019-09-13 NOTE — L&D DELIVERY NOTE
Vaginal Delivery Procedure Note    Kanwal Mayorga  Gestational Age: <None>         OBGYN: David Zendejas MD      Pre-op Diagnosis: Complete Dilation      Anesthesia: Epidual        Detailed Description of Procedure   This patient was admitted to the hospital this morning with a postterm pregnancy.  Her bag of mitchell was ruptured, Pitocin was started, and she went on to deliver a male child over an intact perineum.  The placenta delivered promptly.  Blood loss was 150. Father, mother, and baby are all stable in the birthing room at this time.             Disposition: Transfer to Women's Health Floor  Condition: Stable    David Zendejas M.D     Date: 9/13/2019  Time: 3:03 PM  Kanwal Mayorga, October 23, 1995

## 2019-09-13 NOTE — PLAN OF CARE
Problem: Patient Care Overview  Goal: Plan of Care Review  Outcome: Ongoing (interventions implemented as appropriate)   09/13/19 5915   Coping/Psychosocial   Plan of Care Reviewed With patient   Plan of Care Review   Progress improving

## 2019-09-14 LAB
BASOPHILS # BLD AUTO: 0.01 10*3/MM3 (ref 0–0.2)
BASOPHILS NFR BLD AUTO: 0.1 % (ref 0–1.5)
DEPRECATED RDW RBC AUTO: 46.7 FL (ref 37–54)
EOSINOPHIL # BLD AUTO: 0.19 10*3/MM3 (ref 0–0.4)
EOSINOPHIL NFR BLD AUTO: 1.7 % (ref 0.3–6.2)
ERYTHROCYTE [DISTWIDTH] IN BLOOD BY AUTOMATED COUNT: 15.8 % (ref 12.3–15.4)
HCT VFR BLD AUTO: 30.8 % (ref 34–46.6)
HGB BLD-MCNC: 9.8 G/DL (ref 12–15.9)
IMM GRANULOCYTES # BLD AUTO: 0.02 10*3/MM3 (ref 0–0.05)
IMM GRANULOCYTES NFR BLD AUTO: 0.2 % (ref 0–0.5)
LYMPHOCYTES # BLD AUTO: 2.41 10*3/MM3 (ref 0.7–3.1)
LYMPHOCYTES NFR BLD AUTO: 21.5 % (ref 19.6–45.3)
MCH RBC QN AUTO: 25.9 PG (ref 26.6–33)
MCHC RBC AUTO-ENTMCNC: 31.8 G/DL (ref 31.5–35.7)
MCV RBC AUTO: 81.3 FL (ref 79–97)
MONOCYTES # BLD AUTO: 1.11 10*3/MM3 (ref 0.1–0.9)
MONOCYTES NFR BLD AUTO: 9.9 % (ref 5–12)
NEUTROPHILS # BLD AUTO: 7.47 10*3/MM3 (ref 1.7–7)
NEUTROPHILS NFR BLD AUTO: 66.6 % (ref 42.7–76)
PLATELET # BLD AUTO: 144 10*3/MM3 (ref 140–450)
PMV BLD AUTO: 11.5 FL (ref 6–12)
RBC # BLD AUTO: 3.79 10*6/MM3 (ref 3.77–5.28)
WBC NRBC COR # BLD: 11.21 10*3/MM3 (ref 3.4–10.8)

## 2019-09-14 PROCEDURE — 85025 COMPLETE CBC W/AUTO DIFF WBC: CPT | Performed by: OBSTETRICS & GYNECOLOGY

## 2019-09-14 RX ADMIN — HYDROCORTISONE 2.5% 1 APPLICATION: 25 CREAM TOPICAL at 20:52

## 2019-09-14 RX ADMIN — HYDROCODONE BITARTRATE AND ACETAMINOPHEN 1 TABLET: 5; 325 TABLET ORAL at 20:36

## 2019-09-14 RX ADMIN — HYDROCODONE BITARTRATE AND ACETAMINOPHEN 1 TABLET: 5; 325 TABLET ORAL at 07:48

## 2019-09-14 RX ADMIN — PRENATAL VIT W/ FE FUMARATE-FA TAB 27-0.8 MG 1 TABLET: 27-0.8 TAB at 07:48

## 2019-09-14 RX ADMIN — IBUPROFEN 800 MG: 800 TABLET, FILM COATED ORAL at 07:48

## 2019-09-14 RX ADMIN — IBUPROFEN 800 MG: 800 TABLET, FILM COATED ORAL at 15:13

## 2019-09-14 RX ADMIN — BENZOCAINE 1 APPLICATION: 5.6 OINTMENT TOPICAL at 20:52

## 2019-09-14 RX ADMIN — WITCH HAZEL 1 PAD: 500 SOLUTION RECTAL; TOPICAL at 20:52

## 2019-09-14 RX ADMIN — IBUPROFEN 800 MG: 800 TABLET, FILM COATED ORAL at 20:36

## 2019-09-14 RX ADMIN — HYDROCODONE BITARTRATE AND ACETAMINOPHEN 1 TABLET: 5; 325 TABLET ORAL at 15:15

## 2019-09-14 RX ADMIN — DOCUSATE SODIUM 100 MG: 100 CAPSULE ORAL at 07:49

## 2019-09-14 NOTE — PAYOR COMM NOTE
"[facility]   LISSY PRAKASH  PHONE  [phone]  FAX  [phone]  NPI:  [id number]    DELIVERY NOTIFICATION  9/13/19 @ 1439  VAGINAL DELIVERY  MALE  APGARS:  8/9  WT:  4230 GMS  ICD:  O80  DR. ZENDEJAS     NPI:  [id number]    Jeremias Vaughan (23 y.o. Female)     Date of Birth Social Security Number Address Home Phone MRN    1995  [address] [place] [address] [phone] [id number]    [place] Marital Status          [facility] Single       Admission Date Admission Type Admitting Provider Attending Provider Department, Room/Bed    9/13/19 Urgent David Zendejas MD Cass, Roger Leroy, MD [facility], W242/1    Discharge Date Discharge Disposition Discharge Destination                       Attending Provider:  David Zendejas MD    Allergies:  No Known Allergies    Isolation:  None   Infection:  None   Code Status:  CPR    Ht:  160 cm (63\")   Wt:  73.5 kg (162 lb)    Admission Cmt:  None   Principal Problem:  None                Active Insurance as of 9/13/2019     Primary Coverage     Payor Plan Insurance Group Employer/Plan Group    [employer] MED[employer]     Payor Plan Address Payor Plan Phone Number Payor Plan Fax Number Effective Dates    [address] [phone]  1/1/2017 - None Entered    [facility] [address]       Subscriber Name Subscriber Birth Date Member ID       JEREMIAS VAUGHAN 1995 [id number]                 Emergency Contacts      (Rel.) Home Phone Work Phone Mobile Phone    Rene Jones (Friend) [phone] -- --               History & Physical      David Zendejas MD at 9/13/2019  8:21 AM                                                                         HISTORY    Chief complaint  Here for induction, post term pregnancy    History of present illness  This is a 23-year old G5, P2 patient who is past her EDC, and she is admitted for induction.    Past medical, surgical, obstetrical history  See " attached prenatal record    Family history  See attached prenatal record    Social history  See attached prenatal record    Functional inquiry  See attached prenatal record                                                   PHYSICAL EXAMINATION    General  In no acute distress    HEENT  Throat and ears are clear, no masses or nodes were palpable    CVR  Heart sounds are normal with no murmurs, chest is clear to IPPA    GI/  Abdomen is soft with no masses tenderness or organomegaly.  Uterine size is commensurate with her dates.  Cervix is as noted elsewhere in chart    MS  No gross bone or joint abnormalities.                                                                    IMPRESSION    Post term pregnancy for induction                                                                TREATMENT PLAN    I ruptured her membranes for clear amniotic fluid.  We have Pitocin running.  Expect spontaneous vaginal delivery.  Kanwal Mayorga, October 23, 1995    Electronically signed by David Zendejas MD at 9/13/2019  8:22 AM     H&P filed by [facility] at 9/13/2019  7:23 AM     Scan on 9/13/2019: [employer] 09/13/2019 (below)            Electronically signed by Interface, Scans Incoming at 9/13/2019  7:23 AM          Operative/Procedure Notes (last 24 hours) (Notes from 9/13/2019  7:25 AM through 9/14/2019  7:25 AM)      David Zendejas MD at 9/13/2019  3:03 PM        Vaginal Delivery Procedure Note    Kanwal Mukesh  Gestational Age: <None>         OBGYN: David Zendejas MD      Pre-op Diagnosis: Complete Dilation      Anesthesia: Epidual        Detailed Description of Procedure   This patient was admitted to the hospital this morning with a postterm pregnancy.  Her bag of mitchell was ruptured, Pitocin was started, and she went on to deliver a male child over an intact perineum.  The placenta delivered promptly.  Blood loss was 150. Father, mother, and baby are all stable in the birthing room at this  time.             Disposition: Transfer to Women's Health Floor  Condition: Stable    David Zendejas M.D     Date: 9/13/2019  Time: 3:03 PM  Kanwal Mayorga, October 23, 1995      Electronically signed by David Zendejas MD at 9/13/2019  3:04 PM

## 2019-09-14 NOTE — PLAN OF CARE
Problem: Postpartum (Vaginal Delivery) (Adult,Obstetrics,Pediatric)  Goal: Signs and Symptoms of Listed Potential Problems Will be Absent, Minimized or Managed (Postpartum)  Outcome: Ongoing (interventions implemented as appropriate)   09/14/19 0857   Goal/Outcome Evaluation   Problems Assessed (Postpartum Vaginal Delivery) all   Problems Present (Postpartum Vag Deliv) none

## 2019-09-14 NOTE — PLAN OF CARE
Problem: Patient Care Overview  Goal: Plan of Care Review  Outcome: Ongoing (interventions implemented as appropriate)   09/13/19 2037   Coping/Psychosocial   Plan of Care Reviewed With patient   Plan of Care Review   Progress improving   OTHER   Outcome Summary will dc home stable and able to care for self and infant     Goal: Individualization and Mutuality  Outcome: Ongoing (interventions implemented as appropriate)    Goal: Discharge Needs Assessment  Outcome: Ongoing (interventions implemented as appropriate)    Goal: Interprofessional Rounds/Family Conf  Outcome: Ongoing (interventions implemented as appropriate)      Problem: Labor (Cervical Ripen, Induct, Augment) (Adult,Obstetrics,Pediatric)  Goal: Signs and Symptoms of Listed Potential Problems Will be Absent, Minimized or Managed (Labor)  Outcome: Ongoing (interventions implemented as appropriate)      Problem: Postpartum (Vaginal Delivery) (Adult,Obstetrics,Pediatric)  Goal: Signs and Symptoms of Listed Potential Problems Will be Absent, Minimized or Managed (Postpartum)  Outcome: Ongoing (interventions implemented as appropriate)

## 2019-09-14 NOTE — PROGRESS NOTES
Case Management/Social Work    Patient Name:  Kanwal Mayorga  YOB: 1995  MRN: [id number]  Admit Date:  9/13/2019          SS received consult on this date for late prenatal care starting at 17 weeks. SS spoke with the pt on this date. Pt delivered a baby boy Albert Atkinson, weighing 9lbs and 5ozs.     Pt states that she lives at home with the FOB Rene Jones and her six year old and one year old. Pt states that she has WIC services and SNAP benefits at this time.     Pt states that she has a car seat and infant care supplies needed for the infant.     No UDS was collected on the pt. Infants UDS is negative. Pt states that she does not have a drug history. Infants meconium is pending.     Pt states that she started late prenatal care due to still breastfeeding her 1 year old and did not know that she was pregnant.     Pt states that the FOB or another family member will provide transportation for her and the infant at discharge.     Infant to be discharge home with mother.     Electronically signed by:  ALMA De La Cruz  09/14/19 1:31 PM

## 2019-09-14 NOTE — PROGRESS NOTES
This patient underwent vaginal delivery September 13, 2019.  She had an intact perineum, delivered a healthy male child.  Her most recent vital signs are normal, postpartum hemoglobin is 9.8.  We anticipate discharge tomorrow if everything is going well.    Kanwal Mayorga, October 23, 1995

## 2019-09-14 NOTE — PLAN OF CARE
Problem: Patient Care Overview  Goal: Plan of Care Review  Outcome: Ongoing (interventions implemented as appropriate)   09/14/19 1118   Coping/Psychosocial   Plan of Care Reviewed With patient   Plan of Care Review   Progress improving

## 2019-09-15 VITALS
HEIGHT: 63 IN | HEART RATE: 82 BPM | OXYGEN SATURATION: 99 % | BODY MASS INDEX: 28.7 KG/M2 | SYSTOLIC BLOOD PRESSURE: 123 MMHG | WEIGHT: 162 LBS | DIASTOLIC BLOOD PRESSURE: 67 MMHG | RESPIRATION RATE: 18 BRPM | TEMPERATURE: 99.8 F

## 2019-09-15 RX ORDER — DOCUSATE SODIUM 100 MG/1
100 CAPSULE, LIQUID FILLED ORAL 2 TIMES DAILY
Qty: 60 CAPSULE | Refills: 0 | Status: SHIPPED | OUTPATIENT
Start: 2019-09-15 | End: 2019-10-15

## 2019-09-15 RX ORDER — IBUPROFEN 600 MG/1
600 TABLET ORAL EVERY 6 HOURS PRN
Qty: 30 TABLET | Refills: 0 | Status: SHIPPED | OUTPATIENT
Start: 2019-09-15 | End: 2019-10-15

## 2019-09-15 RX ADMIN — IBUPROFEN 800 MG: 800 TABLET, FILM COATED ORAL at 09:16

## 2019-09-15 RX ADMIN — DOCUSATE SODIUM 100 MG: 100 CAPSULE ORAL at 09:16

## 2019-09-15 RX ADMIN — PRENATAL VIT W/ FE FUMARATE-FA TAB 27-0.8 MG 1 TABLET: 27-0.8 TAB at 09:16

## 2019-09-15 NOTE — DISCHARGE SUMMARY
Discharge Summary    Reason for Hospitalization:  See below    Admission and Discharge Diagnoses:  See below    Course in Hospital:  This patient came to labor and delivery for induction September 13, 2019.  We ruptured her membranes, started Pitocin and she went on to deliver a male child over an intact perineum.  She has done well since delivery, baby has hyperbilirubinemia and is under the light.  We are going to discharge her today, I told her to continue taking her prenatal vitamins, and I gave her Colace and ibuprofen.  Her postpartum hemoglobin is 9.8, white count 11,000.  Vital signs are normal.  She knows not to have intercourse and she knows to call if she has any high fever or heavy bleeding.  She will come see us in the office in 2 weeks in follow-up.    Discharge Condition:  Stable.    Discharge Instructions and Plans for Follow-Up:  As above    Discharge Prescriptions:  As above  Kanwal Mayorga, October 23, 1995

## 2019-09-15 NOTE — PLAN OF CARE
Problem: Patient Care Overview  Goal: Plan of Care Review  Outcome: Ongoing (interventions implemented as appropriate)   09/14/19 2044   Coping/Psychosocial   Plan of Care Reviewed With patient   Plan of Care Review   Progress improving     Goal: Individualization and Mutuality  Outcome: Ongoing (interventions implemented as appropriate)    Goal: Discharge Needs Assessment  Outcome: Ongoing (interventions implemented as appropriate)    Goal: Interprofessional Rounds/Family Conf  Outcome: Ongoing (interventions implemented as appropriate)      Problem: Postpartum (Vaginal Delivery) (Adult,Obstetrics,Pediatric)  Goal: Signs and Symptoms of Listed Potential Problems Will be Absent, Minimized or Managed (Postpartum)  Outcome: Ongoing (interventions implemented as appropriate)

## 2019-09-16 NOTE — PAYOR COMM NOTE
"[facility]  LISSY PRAKASH  PHONE  [phone]  FAX  [phone]  NPI:  [id number]    PATIENT D/C 9/15/19    Jeremias Vaughan (23 y.o. Female)     Date of Birth Social Security Number Address Home Phone MRN    1995  [address] [place] [address] [phone] [id number]    [place] Marital Status          [facility] Single       Admission Date Admission Type Admitting Provider Attending Provider Department, Room/Bed    9/13/19 Urgent David Zendejas MD  [facility], W242/1    Discharge Date Discharge Disposition Discharge Destination        9/15/2019 Home or Self Care              Attending Provider:  (none)   Allergies:  No Known Allergies    Isolation:  None   Infection:  None   Code Status:  Prior    Ht:  160 cm (63\")   Wt:  73.5 kg (162 lb)    Admission Cmt:  None   Principal Problem:  None                Active Insurance as of 9/13/2019     Primary Coverage     Payor Plan Insurance Group Employer/Plan Group    [employer] [facility]     Payor Plan Address Payor Plan Phone Number Payor Plan Fax Number Effective Dates    [address] [phone]  1/1/2017 - None Entered    [facility] [address]       Subscriber Name Subscriber Birth Date Member ID       JEREMIAS VAUGHAN 1995 [id number]                 Emergency Contacts      (Rel.) Home Phone Work Phone Mobile Phone    Rene Jones (Friend) [phone] -- --               Discharge Summary      David Zendejas MD at 9/15/2019 12:27 PM        Discharge Summary    Reason for Hospitalization:  See below    Admission and Discharge Diagnoses:  See below    Course in Hospital:  This patient came to labor and delivery for induction September 13, 2019.  We ruptured her membranes, started Pitocin and she went on to deliver a male child over an intact perineum.  She has done well since delivery, baby has hyperbilirubinemia and is under the light.  We are going to discharge her " today, I told her to continue taking her prenatal vitamins, and I gave her Colace and ibuprofen.  Her postpartum hemoglobin is 9.8, white count 11,000.  Vital signs are normal.  She knows not to have intercourse and she knows to call if she has any high fever or heavy bleeding.  She will come see us in the office in 2 weeks in follow-up.    Discharge Condition:  Stable.    Discharge Instructions and Plans for Follow-Up:  As above    Discharge Prescriptions:  As above  Kanwal Mayorga, October 23, 1995    Electronically signed by David Zendejas MD at 9/15/2019 12:29 PM